# Patient Record
Sex: FEMALE | Employment: OTHER | URBAN - METROPOLITAN AREA
[De-identification: names, ages, dates, MRNs, and addresses within clinical notes are randomized per-mention and may not be internally consistent; named-entity substitution may affect disease eponyms.]

---

## 2017-05-15 ENCOUNTER — OFFICE VISIT (OUTPATIENT)
Dept: FAMILY MEDICINE | Facility: CLINIC | Age: 62
End: 2017-05-15

## 2017-05-15 VITALS
OXYGEN SATURATION: 97 % | SYSTOLIC BLOOD PRESSURE: 172 MMHG | WEIGHT: 137.6 LBS | TEMPERATURE: 97.6 F | HEART RATE: 59 BPM | HEIGHT: 59 IN | BODY MASS INDEX: 27.74 KG/M2 | DIASTOLIC BLOOD PRESSURE: 79 MMHG

## 2017-05-15 DIAGNOSIS — I10 BENIGN ESSENTIAL HYPERTENSION: Primary | ICD-10-CM

## 2017-05-15 DIAGNOSIS — J11.1 INFLUENZA-LIKE ILLNESS: ICD-10-CM

## 2017-05-15 DIAGNOSIS — R05.9 COUGH: ICD-10-CM

## 2017-05-15 RX ORDER — AMLODIPINE BESYLATE 5 MG/1
5 TABLET ORAL DAILY
Qty: 90 TABLET | Refills: 1 | Status: SHIPPED | OUTPATIENT
Start: 2017-05-15 | End: 2017-07-26

## 2017-05-15 NOTE — MR AVS SNAPSHOT
After Visit Summary   5/15/2017    Kitty Ibarra    MRN: [id number]           Patient Information     Date Of Birth          1955        Visit Information        Provider Department      5/15/2017 9:20 AM Fany Colindres MD [facility]        Today's Diagnoses     Benign essential hypertension    -  1    Cough        Influenza-like illness          Care Instructions    For blood pressure:  1. Take amlodipine 5 mg daily  2. Use blood pressure cuff at home, and write down the numbers you get    For illness:  1. Use 1 teaspoon of honey for cough 2-3 times per day  2. Drink plenty of fluids  3. Use tylenol as needed for pain    Follow up in 2 weeks for blood pressure recheck        Follow-ups after your visit        Who to contact     Please call your clinic at [phone] to:    Ask questions about your health    Make or cancel appointments    Discuss your medicines    Learn about your test results    Speak to your doctor   If you have compliments or concerns about an experience at your clinic, or if you wish to file a complaint, please contact [facility] Patient Relations at [phone] or email us at [email or web address]         Additional Information About Your Visit        MyChart Information     Rigelt is an electronic gateway that provides easy, online access to your medical records. With CoachUp, you can request a clinic appointment, read your test results, renew a prescription or communicate with your care team.     To sign up for Rigelt visit the website at [email or web address]   You will be asked to enter the access code listed below, as well as some personal information. Please follow the directions to create your username and password.     Your access code is: [email or web address]  Expires: 2017 10:36 AM     Your access code will  in 90 days. If you need help or a new code, please contact your [facility]  "Clinic or call [phone] for assistance.        Care EveryWhere ID     This is your Care EveryWhere ID. This could be used by other organizations to access your [facility] medical records  [id number]        Your Vitals Were     Pulse Temperature Height Pulse Oximetry BMI (Body Mass Index)       59 97.6  F (36.4  C) (Oral) 4' 10.5\" (148.6 cm) 97% 28.27 kg/m2        Blood Pressure from Last 3 Encounters:   05/15/17 172/79   09/26/16 152/65    Weight from Last 3 Encounters:   05/15/17 137 lb 9.6 oz (62.4 kg)   09/26/16 138 lb (62.6 kg)              We Performed the Following     XR CHEST 2 VW          Today's Medication Changes          These changes are accurate as of: 5/15/17 10:36 AM.  If you have any questions, ask your nurse or doctor.               Start taking these medicines.        Dose/Directions    order for DME   Used for:  Benign essential hypertension   Started by:  Fany Colindres MD        Blood pressure cuff. To be used daily   Quantity:  1 Units   Refills:  0            Where to get your medicines      These medications were sent to [facility] - [place], [place] - [address], [place] [address]     Phone:  [phone]     amLODIPine 5 MG tablet         Some of these will need a paper prescription and others can be bought over the counter.  Ask your nurse if you have questions.     Bring a paper prescription for each of these medications     order for DME                Primary Care Provider Office Phone # Fax #    Nas Snell [phone]       [address] [address]        Thank you!     Thank you for choosing [facility]  for your care. Our goal is always to provide you with excellent care. Hearing back from our patients is one way we can continue to improve our services. Please take a few minutes to complete the written survey that you may receive in the mail after your visit with us. " Thank you!             Your Updated Medication List - Protect others around you: Learn how to safely use, store and throw away your medicines at www.disposemymeds.org.          This list is accurate as of: 5/15/17 10:36 AM.  Always use your most recent med list.                   Brand Name Dispense Instructions for use    amLODIPine 5 MG tablet    NORVASC    90 tablet    Take 1 tablet (5 mg) by mouth daily       gabapentin 300 MG capsule    NEURONTIN    90 capsule    Take 1 capsule (300 mg) by mouth 3 times daily       LIPITOR 20 MG tablet   Generic drug:  atorvastatin     30 tablet    Take 1 tablet (20 mg) by mouth daily       order for DME     1 Units    Blood pressure cuff. To be used daily

## 2017-05-15 NOTE — PROGRESS NOTES
"       SUBJECTIVE       Kitty Ibarra is a 61 year old  female with a PMH significant for:     Patient Active Problem List   Diagnosis     Hepatitis C virus carrier state     Chronic low back pain     Hyperlipidemia LDL goal <100     Screening mammogram     She presents with medication refill and cough.    She is having fever, chills, body aches, cough, and just generally feels unwell.  The cough has been present for the last week.  It was initially productive of clear sputum, but now she has dark yellow sputum production.  She feels feverish and chilled for the last 2 days.  She has not taken her temperature at home. These symptoms has also been accompanied by back and body pains.  Eyes have also been red and watery.  She has also been congested. No nausea or vomiting.   has similar illness, but he got better and she has continued to be sick. Over the last week, patient reports that she feels her symptoms are worsening.  She did not get her flu shot this last year. She has been having some headaches, and she is feeling some blurred vision for the last week    She has not been taking her blood pressure medications for the last 4-5 months. She has been out of her prescription and would like a refill today.  She cannot remember the name of the medication she is supposed to be taking.  Her daughter works downstairs at [employer].  BP was controlled when she was taking the medications.  No chest pain or swelling her legs. Would like a blood pressure cuff at home.    PMH, Medications and Allergies were reviewed and updated as needed.    ROS as above        OBJECTIVE     Vitals:    05/15/17 0934 05/15/17 0944   BP: 179/79 172/79   Pulse: 59 59   Temp: 97.6  F (36.4  C)    TempSrc: Oral    SpO2: 97%    Weight: 137 lb 9.6 oz (62.4 kg)    Height: 4' 10.5\" (148.6 cm)      Body mass index is 28.27 kg/(m^2).    General: Alert, appropriate, and cooperative.  Appears stated age.  Appears somewhat ill.   HEENT:  Atraumatic. "  Pupils equal, round, and reactive bilaterally.  Extraocular movements intact. Right middle ear effusion.  Left TM normal.  Nasal turbinates red and boggy.  Mouth shows moist mucous membranes.    Neck:  Supple.  No adenopathy.  Thyroid palpation within normal limits.  CV:  Regular rhythm and rate.  No murmurs, rubs, or gallops.  Lungs:   Minimal crackles at the left base.  Otherwise, clear to auscultation.  Abd:  Soft, non-distended, non-tender.  Bowel sounds present.  No masses or organomegaly to palpation.  Ext:  Distal pulses 2+ x4.  No lower extremity edema.  Skin:  No obvious rashes, jaundice, or suspicious lesions.    CXR: Personally reviewed by myself and Dr. Ambrose.  No evidence of infiltrate. Will await formal read from radiology.    ASSESSMENT AND PLAN     (I10) Benign essential hypertension  (primary encounter diagnosis)  Comment: Patient has been out of her amlodipine for multiple months now.  BP elevated x2 today.  Would like a refill today.  No CP, SOB, LE edema.  Plan: Refilled amlodipine 5 mg daily.  Prescribed home BP cuff.  Follow up in 2 weeks for BP recheck.    (R69) Influenza-like illness  (R05) Cough  Comment: Patient with 1 week of cough, body aches, subjective fevers, chills.  O2 sat is normal on exam.  CXR negative for infiltrate.  Starting to feel a little bit better today.  Suspect viral illness, rather than PNA.  Plan: Supportive treatment.  Return in 1 week if symptoms not improved.      RTC in 2 weeks for follow up of BP or sooner if develops new or worsening symptoms.    Staffed with Dr. Ambrose.    Fany Colindres MD ([place]) PGY-2  [facility]  5/15/2017

## 2017-05-15 NOTE — PATIENT INSTRUCTIONS
For blood pressure:  1. Take amlodipine 5 mg daily  2. Use blood pressure cuff at home, and write down the numbers you get    For illness:  1. Use 1 teaspoon of honey for cough 2-3 times per day  2. Drink plenty of fluids  3. Use tylenol as needed for pain    Follow up in 2 weeks for blood pressure recheck

## 2017-05-15 NOTE — PROGRESS NOTES
Preceptor attestation:  Patient seen and discussed with the resident. Assessment and plan reviewed with resident and agreed upon.  Supervising physician: Isma Ambrose  [facility]

## 2017-07-26 ENCOUNTER — RECORDS - HEALTHEAST (OUTPATIENT)
Dept: ADMINISTRATIVE | Facility: OTHER | Age: 62
End: 2017-07-26

## 2017-07-26 ENCOUNTER — OFFICE VISIT (OUTPATIENT)
Dept: FAMILY MEDICINE | Facility: CLINIC | Age: 62
End: 2017-07-26

## 2017-07-26 VITALS
SYSTOLIC BLOOD PRESSURE: 166 MMHG | BODY MASS INDEX: 28.71 KG/M2 | WEIGHT: 136.8 LBS | TEMPERATURE: 97.7 F | DIASTOLIC BLOOD PRESSURE: 80 MMHG | HEART RATE: 55 BPM | HEIGHT: 58 IN

## 2017-07-26 DIAGNOSIS — I10 BENIGN ESSENTIAL HYPERTENSION: ICD-10-CM

## 2017-07-26 DIAGNOSIS — R22.1 NECK MASS: Primary | ICD-10-CM

## 2017-07-26 DIAGNOSIS — E78.5 HYPERLIPIDEMIA LDL GOAL <100: ICD-10-CM

## 2017-07-26 RX ORDER — AMLODIPINE BESYLATE 5 MG/1
5 TABLET ORAL DAILY
Qty: 90 TABLET | Refills: 3 | Status: SHIPPED | OUTPATIENT
Start: 2017-07-26 | End: 2018-08-08

## 2017-07-26 RX ORDER — ATORVASTATIN CALCIUM 20 MG/1
20 TABLET, FILM COATED ORAL DAILY
Qty: 90 TABLET | Refills: 3 | Status: SHIPPED | OUTPATIENT
Start: 2017-07-26 | End: 2018-08-08

## 2017-07-26 NOTE — PATIENT INSTRUCTIONS
Start taking amlodipine 5mg once a day.     Have ultrasound of neck done soon. Follow-up with me in 2-3 days after this is done.       [facility]  Radiology Department 1st floor  [address] [address]  [phone]    Appointment  Date:7/27/17  Time:7:45am arrival     Please contact the above clinic if you need to cancel or reschedule. Feel free to contact me with any questions. Thanks!    Cherie  Referral Coordinator  [phone]    Gave to patient and .

## 2017-07-26 NOTE — MR AVS SNAPSHOT
After Visit Summary   2017    Kitty Ibarra    MRN: [id number]           Patient Information     Date Of Birth          1955        Visit Information        Provider Department      2017 9:20 AM Nas Snell MD [facility]        Today's Diagnoses     Neck mass    -  1    Benign essential hypertension        Hyperlipidemia LDL goal <100          Care Instructions    Start taking amlodipine 5mg once a day.     Have ultrasound of neck done soon. Follow-up with me in 2-3 days after this is done.                 Follow-ups after your visit        Future tests that were ordered for you today     Open Future Orders        Priority Expected Expires Ordered    US Head Neck Soft Tissue Routine  2018            Who to contact     Please call your clinic at [phone] to:    Ask questions about your health    Make or cancel appointments    Discuss your medicines    Learn about your test results    Speak to your doctor   If you have compliments or concerns about an experience at your clinic, or if you wish to file a complaint, please contact [facility] Patient Relations at [phone] or email us at [email or web address]         Additional Information About Your Visit        MyChart Information     locr is an electronic gateway that provides easy, online access to your medical records. With locr, you can request a clinic appointment, read your test results, renew a prescription or communicate with your care team.     To sign up for [employer] visit the website at [email or web address]   You will be asked to enter the access code listed below, as well as some personal information. Please follow the directions to create your username and password.     Your access code is: [email or web address]  Expires: 2017 10:36 AM     Your access code will  in 90 days. If you need help or a new code, please contact your [facility]  "[facility] or call [phone] for assistance.        Care EveryWhere ID     This is your Care EveryWhere ID. This could be used by other organizations to access your [facility] medical records  [id number]        Your Vitals Were     Pulse Temperature Height BMI (Body Mass Index)          55 97.7  F (36.5  C) (Oral) 4' 10\" (147.3 cm) 28.59 kg/m2         Blood Pressure from Last 3 Encounters:   07/26/17 166/80   05/15/17 172/79   09/26/16 152/65    Weight from Last 3 Encounters:   07/26/17 136 lb 12.8 oz (62.1 kg)   05/15/17 137 lb 9.6 oz (62.4 kg)   09/26/16 138 lb (62.6 kg)                 Where to get your medicines      These medications were sent to [facility] - [place], [place] - [address], [place] [address]     Phone:  [phone]     amLODIPine 5 MG tablet    atorvastatin 20 MG tablet          Primary Care Provider Office Phone # Fax #    Nas Lashon Snell [phone]       [address] [address]        Equal Access to Services     MANOLO KENDRICK AH: Hadii kaylyn palmo Soavaali, waaxda luqadaha, qaybta kaalmada adeegyada, daria blackman. So [facility] [phone].    ATENCIÓN: Si habla español, tiene a adorno disposición servicios gratuitos de asistencia lingüística. Jana al [phone].    We comply with applicable federal civil rights laws and [place] laws. We do not discriminate on the basis of race, color, national origin, age, disability sex, sexual orientation or gender identity.            Thank you!     Thank you for choosing [facility]  for your care. Our goal is always to provide you with excellent care. Hearing back from our patients is one way we can continue to improve our services. Please take a few minutes to complete the written survey that you may receive in the mail after your visit with us. Thank you!             Your Updated Medication List - Protect others " around you: Learn how to safely use, store and throw away your medicines at www.disposemymeds.org.          This list is accurate as of: 7/26/17  9:55 AM.  Always use your most recent med list.                   Brand Name Dispense Instructions for use Diagnosis    amLODIPine 5 MG tablet    NORVASC    90 tablet    Take 1 tablet (5 mg) by mouth daily    Benign essential hypertension       atorvastatin 20 MG tablet    LIPITOR    90 tablet    Take 1 tablet (20 mg) by mouth daily    Hyperlipidemia LDL goal <100       gabapentin 300 MG capsule    NEURONTIN    90 capsule    Take 1 capsule (300 mg) by mouth 3 times daily    Chronic low back pain, unspecified back pain laterality, with sciatica presence unspecified       order for DME     1 Units    Blood pressure cuff. To be used daily    Benign essential hypertension

## 2017-07-26 NOTE — PROGRESS NOTES
"       SUBJECTIVE       Kitty Ibarra is a 62 year old  female with a PMH significant for:     Patient Active Problem List   Diagnosis     Hepatitis C virus carrier state (H)     Chronic low back pain     Hyperlipidemia LDL goal <100     Screening mammogram     Benign essential hypertension     Patient presents with:  Neck Pain: check lumps on right neck for 3 days     reporting 3 days of neck symptoms on the right side, has tried massaging this area. There is no pain. No unexpected weight loss. No fevers or night sweats. No reported recent illnesses including respiratory illnesses. No bruising of the area. Denies any trauma to the area recently. No difficulty with breathing. Denies any trauma to the area. It is painful tho if she presses firmly and tries to move it around.     PMH, Medications and Allergies were reviewed and updated as needed.    ROS: As above per HPI        OBJECTIVE     Vitals:    07/26/17 0934   BP: 171/76   BP Location: Right arm   Patient Position: Sitting   Cuff Size: Adult Large   Pulse: 52   Temp: 97.7  F (36.5  C)   TempSrc: Oral   Weight: 136 lb 12.8 oz (62.1 kg)   Height: 4' 10\" (147.3 cm)     Body mass index is 28.59 kg/(m^2).    GEN: NAD, AAox3, appears stated age  CV: RRR no m/r/g, s1 and s2 noted  HEENT: EOMI, head normocephalic, sclera anicteric, trachea midline  PULM: clear bilaterally without wheezes/rhonchi/rales, non-labored  ABD: soft, non-tender, + BS in all quadrants  Neck: 3x4cm mass in the right neck in the area of lower anterior cervical chain nodes. No erythema or bruising of the area. Thyroid appears normal.   NEURO: GCS 15  GAIT: normal  PSYCH: euthymic, linear thoughts, no delusions/hallucinations, comprehensible    LABS/IMAGING/EKG  No results found for this or any previous visit (from the past 24 hour(s)).    ASSESSMENT AND PLAN   Neck mass of R side  We'll do ultrasound. This is larger than expected for a reactive lymphadenitis. Does not appear to be cellulitis. No trauma " to suggest hematoma but quality may suggest so. If this rapidly grows, will need to go to ED for possible evacuation/drainage as this would suggest hematoma.     Benign essential hypertension  5mg of amlodipine refilled. Patient encouraged to take daily, she will do this as I educated on her risk of heart and stroke disease       RTC in 2 days for follow up of ultrasound results or sooner if develops new or worsening symptoms.      Nas Snell MD PGY3  [facility] Medicine    Discussed with Dr. Grant Dalal who agrees with the above assessment and plan.

## 2017-07-26 NOTE — PROGRESS NOTES
Preceptor attestation:  Patient seen and discussed with the resident. Assessment and plan reviewed with resident and agreed upon.  Supervising physician: Grant Dalal  [facility]

## 2017-07-27 ENCOUNTER — HOSPITAL ENCOUNTER (OUTPATIENT)
Dept: ULTRASOUND IMAGING | Facility: CLINIC | Age: 62
Discharge: HOME | End: 2017-07-27
Attending: FAMILY MEDICINE

## 2017-07-27 DIAGNOSIS — R22.1 NECK MASS: ICD-10-CM

## 2017-07-28 ENCOUNTER — TRANSFERRED RECORDS (OUTPATIENT)
Dept: HEALTH INFORMATION MANAGEMENT | Facility: CLINIC | Age: 62
End: 2017-07-28

## 2017-07-31 ENCOUNTER — OFFICE VISIT (OUTPATIENT)
Dept: FAMILY MEDICINE | Facility: CLINIC | Age: 62
End: 2017-07-31

## 2017-07-31 VITALS
HEART RATE: 65 BPM | WEIGHT: 137.4 LBS | BODY MASS INDEX: 27.7 KG/M2 | DIASTOLIC BLOOD PRESSURE: 81 MMHG | HEIGHT: 59 IN | SYSTOLIC BLOOD PRESSURE: 151 MMHG

## 2017-07-31 DIAGNOSIS — Z00.00 ROUTINE GENERAL MEDICAL EXAMINATION AT A HEALTH CARE FACILITY: ICD-10-CM

## 2017-07-31 DIAGNOSIS — E04.2 NONTOXIC MULTINODULAR GOITER: Primary | ICD-10-CM

## 2017-07-31 DIAGNOSIS — H11.002 PTERYGIUM EYE, LEFT: ICD-10-CM

## 2017-07-31 LAB
CHOLEST SERPL-MCNC: 200.2 MG/DL (ref 0–200)
CHOLEST/HDLC SERPL: 3.9 {RATIO} (ref 0–5)
HDLC SERPL-MCNC: 51.9 MG/DL
LDLC SERPL CALC-MCNC: 130 MG/DL (ref 0–129)
TRIGL SERPL-MCNC: 90.7 MG/DL (ref 0–150)
TSH SERPL DL<=0.05 MIU/L-ACNC: 0.32 UIU/ML (ref 0.3–5)
VLDL CHOLESTEROL: 18.1 MG/DL (ref 7–32)

## 2017-07-31 NOTE — PROGRESS NOTES
"       SUBJECTIVE       Kitty Ibarra is a 62 year old  female with a PMH significant for:     Patient Active Problem List   Diagnosis     Hepatitis C virus carrier state (H)     Chronic low back pain     Hyperlipidemia LDL goal <100     Screening mammogram     Benign essential hypertension     Multinodular goiter ;  TI-RADS 2 classification     Pterygium eye, left     Patient presents with:  Results: u/s results     Endorses slight pain at the mass. But not getting larger.   No significant changes in weight over the last year. Energy level has been about the same. No chills/sweats that are persistent. Denies rapid heart racing.     Hepatitis C treatment  In past - was treated 3 months by [facility] for this condition.     Colonoscopy she is unaware having done in the last few years.     Reports doing mammogram 3 years ago, elects not to have repeat screening at this time    Daughter says records are at either [facility] ([facility] [address] [facility]) or at [facility]    PMH, Medications and Allergies were reviewed and updated as needed.    ROS: As above per HPI        OBJECTIVE     Vitals:    07/31/17 0850 07/31/17 0853   BP: 152/83 151/81   Pulse: 66 65   Weight: 137 lb 6.4 oz (62.3 kg)    Height: 4' 10.5\" (148.6 cm)      Body mass index is 28.23 kg/(m^2).    GEN: NAD, AAox3, appears stated age  CV: cap refill < 2 seconds  HEENT: EOMI, head normocephalic, sclera anicteric, trachea midline, eye web/growth on left medial side of left eye  PULM: clear bilaterally without wheezes/rhonchi/rales, non-labored  ABD: soft, non-tender, + BS in all quadrants  NEURO: GCS 15  GAIT: normal  PSYCH: euthymic, linear thoughts, no delusions/hallucinations, comprehensible  NECK: firm mass of neck unchanged from previous exam    LABS/IMAGING/EKG    U/S NECK FINDINGS:     Global enlarged thyroid with diffuse heterogeneity and multinodular goiter (though heterogeneity limits focal nodule evaluation). Coarse " calcifications are present in the thyroid.     RIGHT thyroid lobe measures 8.5 x 3.3 x 4.2 cm. Heterogeneous echotexture  Nodule: More focal mid to lower right thyroid nodule measures 3 x 1.8 x 2.5 cm.   Composition: Mixed cystic and solid 1   Echogenicity: Hyperechoic or isoechoic 1   Shape: Wider-than-tall 0   Margin: Ill-defined 0   Echogenic Foci: None, or large comet-tail artifacts 0   Point Total: 1-2 points. TI-RADS 2. Not suspicious. No FNA.      LEFT thyroid lobe measures 6.9 x 4.1 x 3.7 cm. Diffusely heterogeneous echotexture with difficult evaluation for focal nodule     Isthmus measures 2.7 cm.     No cervical lymphadenopathy.     IMPRESSION:   CONCLUSION:  1.  Global enlarged thyroid with diffuse heterogeneity and multinodular goiter (though heterogeneity limits focal nodule evaluation). More focal right thyroid nodule demonstrates TI-RADS 2 classification.  2.  Cannot accurately assess for tracheal mass effect from enlarged thyroid on this exam. Correlate with clinical symptoms.    ASSESSMENT AND PLAN     Multinodular goiter ;  TI-RADS 2 classification  We'll do some screening tests. No indication for follow-up ultrasound or FNA. Heat packs and ibuprofen/tylenol for neck discomfort  - Thyroid P[place] ([facility])  - Thyroid Peroxidase Anaid ([facility])    Routine general medical examination at a health care facility  Patient agreed to Colonoscopy. We'll screen Lipids. Hepatitis C worked up in recent history as she is a carrier and was treated by GI at [facility], release of records obtained  - mammogram (patient declines)   - Colonoscopy  - Lipid Panel ([facility]) - Results < 1 hr    Pterygium eye, left  Lubricating eye drops as needed. Not severe to point it's obstructing vision. This is an irritating condition, we can try referral to ophthalmology if this progresses but otherwise it has been bearable for her at the current state.     RTC in 2-4 weeks for follow up of labs and for physical exam or sooner if  develops new or worsening symptoms.    Future visit:   Follow-up labs drawn today, discuss pap smear, follow-up colon cancer screening, SELENA/PHQ-9 screening.     Nas Snell MD PGY3  [facility]    Discussed with Dr. Isma Ambrose who agrees with the above assessment and plan.

## 2017-07-31 NOTE — PATIENT INSTRUCTIONS
Please schedule a 40 minute appointment for a full physical exam. We can talk about your lab work at that time.     Colonoscopy to be scheduled with .     Neck pain use heat pack and ibuprofen/tylenol as needed    Continue to use lubricating eye drops for your left eye condition    [facility]  Colonoscopy  [phone]  Referral has been faxed they will contact pt to schedule  Dorie Black 10:40 AM 8/2/2017

## 2017-07-31 NOTE — MR AVS SNAPSHOT
After Visit Summary   7/31/2017    Kitty Ibarra    MRN: [id number]           Patient Information     Date Of Birth          1955        Visit Information        Provider Department      7/31/2017 9:00 AM Nas Snell MD [facility]        Today's Diagnoses     Multinodular goiter ;  TI-RADS 2 classification    -  1    Routine general medical examination at a health care facility        Pterygium eye, left          Care Instructions    Please schedule a 40 minute appointment for a full physical exam. We can talk about your lab work at that time.     Colonoscopy to be scheduled with .     Neck pain use heat pack and ibuprofen/tylenol as needed    Continue to use lubricating eye drops for your left eye condition              Follow-ups after your visit        Additional Services     GASTROENTEROLOGY ADULT REF PROCEDURE ONLY       Last Lab Result: No results found for: CR  Body mass index is 28.23 kg/(m^2).     Needed:  Yes  Language:  Hmong    Patient will be contacted to schedule procedure.     Please be aware that coverage of these services is subject to the terms and limitations of your health insurance plan.  Call member services at your health plan with any benefit or coverage questions.  Any procedures must be performed at a [facility] facility OR coordinated by your clinic's referral office.    Please bring the following with you to your appointment:    (1) Any X-Rays, CTs or MRIs which have been performed.  Contact the facility where they were done to arrange for  prior to your scheduled appointment.    (2) List of current medications   (3) This referral request   (4) Any documents/labs given to you for this referral                  Who to contact     Please call your clinic at [phone] to:    Ask questions about your health    Make or cancel appointments    Discuss your medicines    Learn about your test results    Speak to your doctor   If you have  "compliments or concerns about an experience at your clinic, or if you wish to file a complaint, please contact [facility] Patient Relations at [phone] or email us at [email or web address]         Additional Information About Your Visit        Habbitshart Information     Immusoft is an electronic gateway that provides easy, online access to your medical records. With Immusoft, you can request a clinic appointment, read your test results, renew a prescription or communicate with your care team.     To sign up for Immusoft visit the website at [email or web address]   You will be asked to enter the access code listed below, as well as some personal information. Please follow the directions to create your username and password.     Your access code is: [email or web address]  Expires: 2017 10:36 AM     Your access code will  in 90 days. If you need help or a new code, please contact your [facility] or call [phone] for assistance.        Care EveryWhere ID     This is your Care EveryWhere ID. This could be used by other organizations to access your [facility] medical records  [id number]        Your Vitals Were     Pulse Height BMI (Body Mass Index)             65 4' 10.5\" (148.6 cm) 28.23 kg/m2          Blood Pressure from Last 3 Encounters:   17 151/81   17 166/80   05/15/17 172/79    Weight from Last 3 Encounters:   17 137 lb 6.4 oz (62.3 kg)   17 136 lb 12.8 oz (62.1 kg)   05/15/17 137 lb 9.6 oz (62.4 kg)              We Performed the Following     GASTROENTEROLOGY ADULT REF PROCEDURE ONLY     Lipid Panel ([facility]) - Results < 1 hr     Thyroid [place] ([facility])     Thyroid Peroxidase [place] ([facility])          Today's Medication Changes          These changes are accurate as of: 17  9:25 AM.  If you have any questions, ask your nurse or doctor.               Start taking these medicines.        Dose/Directions    " glycerin-hypromellose- 0.2-0.2-1 % Soln ophthalmic solution   Commonly known as:  ARTIFICIAL TEARS   Used for:  Pterygium eye, left   Started by:  Nas Snell MD        Dose:  1 drop   Place 1 drop Into the left eye 3 times daily as needed for dry eyes   Quantity:  1 Bottle   Refills:  11         Stop taking these medicines if you haven't already. Please contact your care team if you have questions.     gabapentin 300 MG capsule   Commonly known as:  NEURONTIN   Stopped by:  Nas Snell MD                Where to get your medicines      These medications were sent to [facility] - [place], [place] - [address], [place] [address]     Phone:  [phone]     glycerin-hypromellose- 0.2-0.2-1 % Soln ophthalmic solution                Primary Care Provider Office Phone # Fax #    Nas Snell [phone]       [address] [address]        Equal Access to Services     KENNETH KENDRICK AH: Hadii kaylyn ku hadasho Soomaali, waaxda luqadaha, qaybta kaalmada adeegyada, waxay idiin hayelli malik . So [facility] [phone].    ATENCIÓN: Si habla español, tiene a adorno disposición servicios gratuitos de asistencia lingüística. [facility] [phone].    We comply with applicable federal civil rights laws and [place] laws. We do not discriminate on the basis of race, color, national origin, age, disability sex, sexual orientation or gender identity.            Thank you!     Thank you for choosing [facility]  for your care. Our goal is always to provide you with excellent care. Hearing back from our patients is one way we can continue to improve our services. Please take a few minutes to complete the written survey that you may receive in the mail after your visit with us. Thank you!             Your Updated Medication List - Protect others around you: Learn how to safely use, store and throw  away your medicines at www.disposemymeds.org.          This list is accurate as of: 7/31/17  9:25 AM.  Always use your most recent med list.                   Brand Name Dispense Instructions for use Diagnosis    amLODIPine 5 MG tablet    NORVASC    90 tablet    Take 1 tablet (5 mg) by mouth daily    Benign essential hypertension       atorvastatin 20 MG tablet    LIPITOR    90 tablet    Take 1 tablet (20 mg) by mouth daily    Hyperlipidemia LDL goal <100       glycerin-hypromellose- 0.2-0.2-1 % Soln ophthalmic solution    ARTIFICIAL TEARS    1 Bottle    Place 1 drop Into the left eye 3 times daily as needed for dry eyes    Pterygium eye, left       order for DME     1 Units    Blood pressure cuff. To be used daily    Benign essential hypertension

## 2017-08-01 LAB — THYR PEROXIDASE ABY: 14 IU/ML (ref 0–5.6)

## 2017-08-08 NOTE — PROGRESS NOTES
Preceptor attestation:  Patient seen and discussed with the resident. Assessment and plan reviewed with resident and agreed upon.  Supervising physician: Isma Ambrose  [facility]

## 2018-01-07 ENCOUNTER — HEALTH MAINTENANCE LETTER (OUTPATIENT)
Age: 63
End: 2018-01-07

## 2018-08-08 DIAGNOSIS — E78.5 HYPERLIPIDEMIA LDL GOAL <100: ICD-10-CM

## 2018-08-08 DIAGNOSIS — I10 BENIGN ESSENTIAL HYPERTENSION: ICD-10-CM

## 2018-08-10 RX ORDER — ATORVASTATIN CALCIUM 20 MG/1
20 TABLET, FILM COATED ORAL DAILY
Qty: 90 TABLET | Refills: 3 | Status: SHIPPED | OUTPATIENT
Start: 2018-08-10 | End: 2019-12-30

## 2018-08-10 RX ORDER — AMLODIPINE BESYLATE 5 MG/1
5 TABLET ORAL DAILY
Qty: 90 TABLET | Refills: 3 | Status: SHIPPED | OUTPATIENT
Start: 2018-08-10 | End: 2019-12-30

## 2018-08-14 NOTE — TELEPHONE ENCOUNTER
Spoke to pt's daughter in law and she states that pt is out of town right now. She will help schedule a physical once pt is back in town. Kayli Black MA

## 2019-12-30 ENCOUNTER — OFFICE VISIT (OUTPATIENT)
Dept: FAMILY MEDICINE | Facility: CLINIC | Age: 64
End: 2019-12-30
Payer: COMMERCIAL

## 2019-12-30 VITALS
SYSTOLIC BLOOD PRESSURE: 128 MMHG | OXYGEN SATURATION: 97 % | RESPIRATION RATE: 24 BRPM | WEIGHT: 145.2 LBS | HEART RATE: 62 BPM | BODY MASS INDEX: 29.83 KG/M2 | DIASTOLIC BLOOD PRESSURE: 77 MMHG | TEMPERATURE: 97.7 F

## 2019-12-30 DIAGNOSIS — E04.9 GOITER: Primary | ICD-10-CM

## 2019-12-30 RX ORDER — AMLODIPINE BESYLATE 5 MG/1
5 TABLET ORAL DAILY
Qty: 90 TABLET | Refills: 3 | Status: SHIPPED | OUTPATIENT
Start: 2019-12-30 | End: 2021-03-04

## 2019-12-30 RX ORDER — ATORVASTATIN CALCIUM 20 MG/1
20 TABLET, FILM COATED ORAL DAILY
Qty: 90 TABLET | Refills: 3 | Status: SHIPPED | OUTPATIENT
Start: 2019-12-30 | End: 2021-05-24

## 2019-12-30 NOTE — PROGRESS NOTES
SUBJECTIVE       Kitty Ibarra is a 64 year old  female with a PMH significant for:     Patient Active Problem List   Diagnosis     Hepatitis C virus carrier state (H)     Chronic low back pain     Hyperlipidemia LDL goal <100     Screening mammogram     Benign essential hypertension     Multinodular goiter ;  TI-RADS 2 classification     Pterygium eye, left     She presents with ED follow up.  She was seen for a UTI that is improved.  However, they saw an abnormality on CXR.  There was marked tracheal deviation that was consistent with compression from a mass or substernal goiter.  She reports a long standing goiter that has gradually enlarged over the past few years.  She denies any pain or shortness of breath from this.  No stridor, no dysphagia, no odynophagia.  No fevers, chills, or weight loss.    PMH, Medications and Allergies were reviewed and updated as needed.        REVIEW OF SYSTEMS     INTEGUMENTARY/SKIN: NEGATIVE for worrisome rashes, moles or lesions  EYES: NEGATIVE for vision changes or irritation  NEURO: NEGATIVE for weakness, dizziness or paresthesias        OBJECTIVE     Vitals:    12/30/19 0856   BP: 128/77   Pulse: 62   Resp: 24   Temp: 97.7  F (36.5  C)   TempSrc: Oral   SpO2: 97%   Weight: 65.9 kg (145 lb 3.2 oz)     Body mass index is 29.83 kg/m .    Constitutional: Awake, alert, cooperative, no apparent distress, and appears stated age.  Eyes: Lids and lashes normal, pupils equal, round and reactive to light, extra ocular muscles intact, sclera clear, conjunctiva normal.  ENT: Normocephalic, without obvious abnormality, atramatic, sinuses nontender on palpation, external ears without lesions, oral pharynx with moist mucus membranes, tonsils without erythema or exudates, gums normal and good dentition.  Neck: massive goiter predominantly on the midline and on the right side of the neck.  Hematologic / Lymphatic: No cervical lymphadenopathy and no supraclavicular lymphadenopathy.  Lungs: No  increased work of breathing, good air exchange, clear to auscultation bilaterally, no crackles or wheezing.  Cardiovascular: Regular rate and rhythm, normal S1 and S2, no S3 or S4, and no murmur noted.  Abdomen: No scars, normal bowel sounds, soft, non-distended, non-tender, no masses palpated, no hepatosplenomegally.  Neuropsychiatric: Normal affect, mood, orientation, memory and insight.  Skin: No rashes, erythema, pallor, petechia or purpura.    No results found for this or any previous visit (from the past 24 hour(s)).        ASSESSMENT AND PLAN     1. Goiter  Will check the scans  - CT SOFT TISSUE NECK W/O CONTRAST; Future  - CT CHEST W/O & W CONTRAST; Future        RTC in 1 week from the CT for follow up of the goiter or sooner if develops new or worsening symptoms.    Claudy Soriano MD

## 2019-12-30 NOTE — PATIENT INSTRUCTIONS
[facility]  Phone: [phone]  Fax: [phone]    [facility]  [address], [address]  [place]  [address]    Appointment:  Monday January 13, 2020  Arrival Time:  7:00 am    Nothing to eat or drink 3 hours prior.     Please bring a copy of your insurance card and photo ID    If you cannot make this appointment, please call [phone] to reschedule    Referral and demographics faxed to [phone]

## 2020-01-13 ENCOUNTER — TRANSFERRED RECORDS (OUTPATIENT)
Dept: HEALTH INFORMATION MANAGEMENT | Facility: CLINIC | Age: 65
End: 2020-01-13

## 2020-01-13 DIAGNOSIS — E04.9 GOITER: ICD-10-CM

## 2020-01-21 ENCOUNTER — TELEPHONE (OUTPATIENT)
Dept: FAMILY MEDICINE | Facility: CLINIC | Age: 65
End: 2020-01-21

## 2020-01-21 DIAGNOSIS — E04.2 NONTOXIC MULTINODULAR GOITER: Primary | ICD-10-CM

## 2020-01-21 NOTE — TELEPHONE ENCOUNTER
Patients daughter Jenn called in regards to letter they received from Dr. Soriano. Letter states because on lab results they should see an endocrinologist. Jenn is wondering if Dr. Soriano put in a referral or if she is supposed to find a provider on her own.     I let Jenn know that I would send Dr. Soriano a message and ask if he can submit a referral.     Viv Rutherford

## 2020-01-27 NOTE — TELEPHONE ENCOUNTER
01/27/20   Called patient contact May to ask if they were willing to travel to the [facility] for the endocrine appt. As [facility] does not have an MD right now and the PA's can only see certain diagnosis.     May said she will have Jenn call me back to discuss.       Viv Rutherford

## 2020-01-28 ENCOUNTER — TELEPHONE (OUTPATIENT)
Dept: FAMILY MEDICINE | Facility: CLINIC | Age: 65
End: 2020-01-28

## 2020-01-28 NOTE — TELEPHONE ENCOUNTER
Jenn called back wondering when they could get her mother in law in which the [facility] cant get the patient in for about 2 months. The patient is leaving on a trip from February 6, 2020 to May 2020. They said they will schedule when she gets back.  Mary Silverio, PHOEBE

## 2020-01-29 ENCOUNTER — TRANSCRIBE ORDERS (OUTPATIENT)
Dept: OTHER | Age: 65
End: 2020-01-29

## 2020-01-29 DIAGNOSIS — E04.2 MULTINODULAR GOITER: Primary | ICD-10-CM

## 2020-01-29 NOTE — TELEPHONE ENCOUNTER
Endocrine Clinic  [facility]  Phone:[phone]  Fax: [phone]    Demographics, referral, office note, imaging and med list faxed to [phone], they will contact patient to schedule. I will noted that patient to going to traveling from February 6th - May 2020.    Viv Rutherford

## 2020-01-31 ENCOUNTER — TELEPHONE (OUTPATIENT)
Dept: ENDOCRINOLOGY | Facility: CLINIC | Age: 65
End: 2020-01-31

## 2020-01-31 NOTE — TELEPHONE ENCOUNTER
To schedulers: Please schedule  for lst available endocrine within 28 days.  Get images pushed to PACS prior to the appt, including [facility] thyroid US 7/27/2017 and [facility] radiology 1/13/2020 CT neck and chest .  Preferred provider Darya Kenny Trost Lynn A Burmeister, MD  Endocrine triage

## 2020-02-07 ENCOUNTER — TELEPHONE (OUTPATIENT)
Dept: ENDOCRINOLOGY | Facility: CLINIC | Age: 65
End: 2020-02-07

## 2020-02-07 NOTE — TELEPHONE ENCOUNTER
To schedulers: Please see me 1/31/2020 triage on this same patient.  Why was this sent again and not handled yet?     Evita Kenny MD  Endocrine triage       Health Call Center    Phone Message    May a detailed message be left on voicemail: no     Reason for Call: Other: NP referred to Endocrinology for Multinodular goiter [E04.2] Ref Claudy Soriano from [place]     Action Taken: Other: Endocrinology    Travel Screening: Not Applicable

## 2020-02-17 NOTE — TELEPHONE ENCOUNTER
Previous encounter triage states:      Encounter was accidentally closed, causing it to disappear from our inbasket. Will call patient to schedule right away.

## 2020-02-19 NOTE — TELEPHONE ENCOUNTER
"To schedulers: Please add \"Get images pushed to PACS prior to the appt, including [facility] thyroid US 7/27/2017 and [facility] radiology 1/13/2020 CT neck and chest\" to notes for records retrieval staff to see.  "

## 2020-02-19 NOTE — TELEPHONE ENCOUNTER
Called and left message with  for pt to c/b to schedule 1st available NEN within 28 days. Ok to use 60 min SANTOS slot. Call/Michellec Frida or Karine if you need help.

## 2020-03-05 NOTE — TELEPHONE ENCOUNTER
LVMx2 for pt to schedule 1st available NEN within 28 days. OK to use 60 min SANTOS slot. Call/charlesc Frida or Karine for scheduling questions if any.

## 2020-04-17 ENCOUNTER — TELEPHONE (OUTPATIENT)
Dept: FAMILY MEDICINE | Facility: CLINIC | Age: 65
End: 2020-04-17

## 2020-04-17 NOTE — LETTER
April 17, 2020      Kitty Ibarra  [facility] [address]        Dear Kitty,      We tried reaching you by phone but were unable to connect with you. We are reaching out to see how you are doing. This is a very stressful time in the world, which can cause an increase in personal stress and anxiety.     Our clinic is open.  We are here for you and are ready to meet all of your healthcare needs.  We have delayed preventive care until July.  We want everyone who can to stay home during this time for their health and the health of all.  We are now having most visits over the phone, but will see people in person if your doctor agrees that it is necessary.  We also will have video visits starting on April 6, 2020.      Call us with any questions or concerns you may have, and know that we are all in this together.       Sincerely,     Your team at [facility]  [phone]

## 2021-03-01 ENCOUNTER — OFFICE VISIT (OUTPATIENT)
Dept: FAMILY MEDICINE | Facility: CLINIC | Age: 66
End: 2021-03-01
Payer: COMMERCIAL

## 2021-03-01 ENCOUNTER — RECORDS - HEALTHEAST (OUTPATIENT)
Dept: ADMINISTRATIVE | Facility: OTHER | Age: 66
End: 2021-03-01

## 2021-03-01 VITALS
WEIGHT: 149.2 LBS | DIASTOLIC BLOOD PRESSURE: 71 MMHG | SYSTOLIC BLOOD PRESSURE: 143 MMHG | TEMPERATURE: 98.3 F | BODY MASS INDEX: 30.65 KG/M2 | HEART RATE: 68 BPM | OXYGEN SATURATION: 97 % | RESPIRATION RATE: 18 BRPM

## 2021-03-01 DIAGNOSIS — E04.2 NONTOXIC MULTINODULAR GOITER: Primary | ICD-10-CM

## 2021-03-01 DIAGNOSIS — R10.84 ABDOMINAL PAIN, GENERALIZED: ICD-10-CM

## 2021-03-01 LAB
% IMMATURE GRANULOCYTES: 0 %
ABS IMMATURE GRANULOCYTES: 0 THOU/UL
ALBUMIN SERPL BCP-MCNC: 3.8 G/DL (ref 3.5–5)
ALP SERPL-CCNC: 104 U/L (ref 45–120)
ALT SERPL W/O P-5'-P-CCNC: 13 U/L (ref 0–45)
ANION GAP SERPL CALCULATED.3IONS-SCNC: 10 MMOL/L (ref 5–18)
AST SERPL-CCNC: 15 U/L (ref 0–40)
BASOPHILS # BLD AUTO: 0 THOU/UL (ref 0–0.2)
BASOPHILS NFR BLD AUTO: 1 % (ref 0–2)
BILIRUB SERPL-MCNC: 0.7 MG/DL (ref 0–1)
BILIRUBIN UR: NEGATIVE MG/DL
BLOOD UR: ABNORMAL MG/DL
BUN SERPL-MCNC: 16 MG/DL (ref 8–22)
CALCIUM SERPL-MCNC: 8.9 MG/DL (ref 8.5–10.5)
CHLORIDE SERPL-SCNC: 105 MMOL/L (ref 98–107)
CO2 SERPL-SCNC: 26 MMOL/L (ref 22–31)
CREAT SERPL-MCNC: 0.68 MG/DL (ref 0.6–1.1)
EOSINOPHIL # BLD AUTO: 0.3 THOU/UL (ref 0–0.4)
EOSINOPHIL NFR BLD AUTO: 4 % (ref 0–6)
ERYTHROCYTE [DISTWIDTH] IN BLOOD BY AUTOMATED COUNT: 12.7 % (ref 11–14.5)
GLUCOSE SERPL-MCNC: 85 MG/DL (ref 70–125)
GLUCOSE URINE: NEGATIVE
HCT VFR BLD AUTO: 42.1 % (ref 35–47)
HGB BLD-MCNC: 13.8 G/DL (ref 12–16)
KETONES UR QL: NEGATIVE MG/DL
LEUKOCYTE ESTERASE UR: ABNORMAL
LIPASE SERPL-CCNC: 39 U/L (ref 0–52)
LYMPHOCYTES # BLD AUTO: 2.5 THOU/UL (ref 0.8–4.4)
LYMPHOCYTES NFR BLD AUTO: 38 % (ref 20–40)
MCH RBC QN AUTO: 29.5 PG (ref 27–34)
MCHC RBC AUTO-ENTMCNC: 32.8 G/DL (ref 32–36)
MCV RBC AUTO: 90 FL (ref 80–100)
MONOCYTES # BLD AUTO: 0.5 THOU/UL (ref 0–0.9)
MONOCYTES NFR BLD AUTO: 8 % (ref 2–10)
NEUTROPHILS # BLD AUTO: 3.3 THOU/UL (ref 2–7.7)
NEUTROPHILS NFR BLD AUTO: 50 % (ref 50–70)
NITRITE UR QL STRIP: NEGATIVE MG/DL
PH UR STRIP: 6 [PH] (ref 4.5–8)
PLATELET # BLD AUTO: 227 THOU/UL (ref 140–440)
PMV BLD AUTO: 10.7 FL (ref 8.5–12.5)
POTASSIUM SERPL-SCNC: 4.1 MMOL/L (ref 3.5–5)
PROT SERPL-MCNC: 6.8 G/DL (ref 6–8)
PROTEIN UR: NEGATIVE MG/DL
RBC # BLD AUTO: 4.68 MILL/UL (ref 3.8–5.4)
SODIUM SERPL-SCNC: 141 MMOL/L (ref 136–145)
SP GR UR STRIP: 1.02 (ref 1–1.03)
TSH SERPL DL<=0.05 MIU/L-ACNC: 0.52 UIU/ML (ref 0.3–5)
UROBILINOGEN UR STRIP-ACNC: ABNORMAL E.U./DL
WBC # BLD AUTO: 6.6 THOU/UL (ref 4–11)

## 2021-03-01 PROCEDURE — 99214 OFFICE O/P EST MOD 30 MIN: CPT | Mod: GC | Performed by: STUDENT IN AN ORGANIZED HEALTH CARE EDUCATION/TRAINING PROGRAM

## 2021-03-01 PROCEDURE — 36415 COLL VENOUS BLD VENIPUNCTURE: CPT | Performed by: STUDENT IN AN ORGANIZED HEALTH CARE EDUCATION/TRAINING PROGRAM

## 2021-03-01 PROCEDURE — 81003 URINALYSIS AUTO W/O SCOPE: CPT | Performed by: STUDENT IN AN ORGANIZED HEALTH CARE EDUCATION/TRAINING PROGRAM

## 2021-03-01 RX ORDER — FAMOTIDINE 20 MG/1
20 TABLET, FILM COATED ORAL 2 TIMES DAILY
Qty: 60 TABLET | Refills: 0 | Status: SHIPPED | OUTPATIENT
Start: 2021-03-01

## 2021-03-01 NOTE — PATIENT INSTRUCTIONS
21   [facility]   [phone]  Fax Orders to [phone]     Order faxed to [phone], they will contact patient to schedule.     Viv Rutherford    21   ENDOCRINE REFERRAL    [facility]  Phone: [phone]  Fax: [phone]    Referral, demographics, office notes, labs and medication list faxed to [facility] at [phone] who will contact patient to schedule.     Viv Rutherford

## 2021-03-01 NOTE — PROGRESS NOTES
"    Assessment & Plan     Multinodular goiter ;  TI-RADS 2 classification  Patient with significantly enlarged thyroid appreciated on exam.  Is causing her some discomfort and even possibly making it hard to breathe at times.  Previously on imaging was found to have multinodular goiter, also had elevated thyroid peroxidase.  Patient should be seen by endocrinology and was referred 1 year ago but was not able to make it due to Covid pandemic.  In the meantime we will check thyroid cascade and start levothyroxine should TSH be low.  Will also obtain follow-up ultrasound.  - Thyroid [place] ([facility])  - US Head Neck Soft Tissue; Future  - ENDOCRINOLOGY ADULT REFERRAL; Future    Abdominal pain, generalized  Patient with minor generalized abdominal pain and symptoms consistent with reflux.  Will rule out liver gallbladder, urinary tract with labs, and trial famotidine with follow-up in 1 month.  - Lipase ([facility])  - Urinalysis ([facility] )  - famotidine (PEPCID) 20 MG tablet; Take 1 tablet (20 mg) by mouth 2 times daily  - CBC w/ Diff and Plt ([facility])  - Comprehensive Metabolic ([facility]) - Results > 1 hr      Return in about 4 weeks (around 3/29/2021) for with me, Follow up.    Andrez Fish MD  [facility]    Ruthy Tang is a 65 year old who presents for the following health issues     HPI   She is here today due to neck discomfort and some SOB, as well as abdominal pain.     Her neck discomfort is described as a pressure and at times it feels difficult to breathe. She also describes a stiffness sensation to her neck. She was referred to a specialist in the past but was unable to follow up due to covid. Her neck has grown bigger since the past. There is no pain.  Imaging in the past is shown multinodular goiter.  She is also had elevated thyroid peroxidase.    Her abdominal pain is a R flank pain stomach upset, \"hanging sensation\", burping with bitter mucous.      Patient " reports she is always cold, dry skin, some hair loss, chronically fatigued, difficulty sleeping.      Review of Systems   Constitutional, HEENT, cardiovascular, pulmonary, gi and gu systems are negative, except as otherwise noted.      Objective    BP (!) 143/71 (BP Location: Left arm, Patient Position: Sitting, Cuff Size: Adult Regular)   Pulse 68   Temp 98.3  F (36.8  C) (Oral)   Resp 18   Wt 67.7 kg (149 lb 3.2 oz)   SpO2 97%   BMI 30.65 kg/m    Body mass index is 30.65 kg/m .  Physical Exam   GENERAL: healthy, alert and no distress  EYES: Eyes grossly normal to inspection, PERRL and conjunctivae and sclerae normal  NECK: Very significantly enlarged thyroid with right greater than left, firm neck to palpation, no pain with palpation  RESP: lungs clear to auscultation - no rales, rhonchi or wheezes  CV: regular rate and rhythm, normal S1 S2, no S3 or S4, no murmur, click or rub, no peripheral edema and peripheral pulses strong  ABDOMEN: Mild tenderness to palpation in the right upper quadrant and just distal to the umbilicus without hepatosplenomegaly or masses, bowel sounds normal  MS: no gross musculoskeletal defects noted, no edema  SKIN: no suspicious lesions or rashes  NEURO: Normal strength and tone, mentation intact and speech normal  PSYCH: mentation appears normal, affect normal/bright  LYMPH: no cervical, supraclavicular, axillary adenopathy

## 2021-03-01 NOTE — PROGRESS NOTES
Preceptor Attestation:    I discussed the patient with the resident and evaluated the patient in person. I have verified the content of the note, which accurately reflects my assessment of the patient and the plan of care.   Supervising Physician:  Theron Mann MD.

## 2021-03-01 NOTE — NURSING NOTE
Due to patient being non-English speaking/uses sign language, an  was used for this visit. Only for face-to-face interpretation by an external agency, date and length of interpretation can be found on the scanned worksheet.       name: Evangelina Lewis  Language: Stephenong  Agency:  [employer]  Telephone number: [phone]  Type of interpretation:  telephone, spoken    OLGA Woodruff  2:13 PM  3/1/2021

## 2021-03-03 DIAGNOSIS — I10 BENIGN ESSENTIAL HYPERTENSION: Primary | ICD-10-CM

## 2021-03-03 NOTE — TELEPHONE ENCOUNTER
[facility] phone call message- patient requesting a refill:    Full Medication Name: amLODIPine (NORVASC) 5 MG tablet    Dose: Route: Take 1 tablet (5 mg) by mouth daily - Oral    Pharmacy confirmed as   [facility] [address] [place] [address] [address]  [address] [facility] [address] [facility] [address] [facility] [address]  Phone: [phone] Fax: [phone]  : Yes    Additional Comments: NONE     OK to leave a message on voice mail? Yes    Primary language: ong      needed? Yes    Call taken on March 3, 2021 at 10:20 AM by Jay Allan

## 2021-03-04 RX ORDER — AMLODIPINE BESYLATE 5 MG/1
5 TABLET ORAL DAILY
Qty: 90 TABLET | Refills: 3 | Status: SHIPPED | OUTPATIENT
Start: 2021-03-04

## 2021-03-05 ENCOUNTER — COMMUNICATION - HEALTHEAST (OUTPATIENT)
Dept: ADMINISTRATIVE | Facility: CLINIC | Age: 66
End: 2021-03-05

## 2021-03-08 ENCOUNTER — HOSPITAL ENCOUNTER (OUTPATIENT)
Dept: ULTRASOUND IMAGING | Facility: HOSPITAL | Age: 66
Discharge: HOME | End: 2021-03-08

## 2021-03-08 DIAGNOSIS — E04.2 MULTINODULAR GOITER: ICD-10-CM

## 2021-03-24 NOTE — PROGRESS NOTES
"    Assessment & Plan       Goiter  Persistent enlarged heterogeneous thyroid, with no discrete measurable thyroid nodule on the most recent US. Discussed results with patient. TSH is normal. Will resend referral to endocrinology now that imaging is completed.   - ENDOCRINOLOGY ADULT REFERRAL; Future    Benign essential hypertension  Elevated today, however patient did not take her medication this morning. Discussed the importance of taking her BP medication every day.  - Continue to monitor at future appointments    Patient Instructions   1. Endocrinology will call you to set up an appointment  2. Follow up with your primary care physician as needed      Return if symptoms worsen or fail to improve.    Sarika Butterfield MD  [facility] ANGIE Bliss Mai is a 65 year old who presents for the following health issues:    HPI     Here for follow up on thyroid imaging for her history of goiter. Having headache and having tightness sensation in the neck. Throat feels tight, and sometimes feels like it affects breathing. Reported that she did not take her BP medication this AM.     Reviewed imaging from 3/8/21 (see below).     Review of Systems   See HPI above      Objective    BP (!) 158/78   Pulse 61   Temp 97.6  F (36.4  C) (Oral)   Resp 12   Ht 1.47 m (4' 9.87\")   Wt 67.5 kg (148 lb 12.8 oz)   SpO2 100%   BMI 31.23 kg/m    Body mass index is 31.23 kg/m .  Physical Exam   GENERAL: healthy, alert and no distress  EYES: conjunctiva injected  NECK: thyromegaly  RESP: lungs clear to auscultation - no rales, rhonchi or wheezes  CV: Borderline bradycardic with regular rhythm, normal S1 S2, no S3 or S4, no murmur, click or rub  MS: no gross musculoskeletal defects noted, no edema    US Thyroid (03/08/2021 12:00 PM CST)   Interface, Rad Results In - 03/08/2021 1:29 PM CST    Formatting of this note might be different from the original.  EXAM: US THYROID  LOCATION[facility]  DATE/TIME: 3/8/2021 12:00 PM    INDICATION: Nontoxic multinodular goiter  COMPARISON: CT neck 01/13/2020 and ultrasound thyroid 07/27/2017  TECHNIQUE: Thyroid ultrasound.     FINDINGS:  RIGHT lobe: 9.3 x 4.1 x 4.6 cm. Diffusely heterogeneous. No hypervascularity and no discrete measurable nodule.  Isthmus: 22 mm.  LEFT lobe: 9.1 x 3.6 x 4.4 cm. Diffusely heterogeneous. No hypervascularity and no discrete measurable nodule.    NECK: No cervical lymphadenopathy.      IMPRESSION:   1. Persistent enlarged heterogeneous thyroid consistent with the history of thyroid goiter. No discrete measurable thyroid nodule on this exam. Previously noted right thyroid lobe nodule is not clearly seen on this exam.         ----- Service Performed and Documented by Resident or Fellow ------

## 2021-03-26 ENCOUNTER — OFFICE VISIT (OUTPATIENT)
Dept: FAMILY MEDICINE | Facility: CLINIC | Age: 66
End: 2021-03-26
Payer: COMMERCIAL

## 2021-03-26 VITALS
BODY MASS INDEX: 31.23 KG/M2 | OXYGEN SATURATION: 100 % | WEIGHT: 148.8 LBS | DIASTOLIC BLOOD PRESSURE: 78 MMHG | HEART RATE: 61 BPM | RESPIRATION RATE: 12 BRPM | SYSTOLIC BLOOD PRESSURE: 158 MMHG | TEMPERATURE: 97.6 F | HEIGHT: 58 IN

## 2021-03-26 DIAGNOSIS — E04.9 GOITER: Primary | ICD-10-CM

## 2021-03-26 DIAGNOSIS — E04.2 NONTOXIC MULTINODULAR GOITER: ICD-10-CM

## 2021-03-26 DIAGNOSIS — I10 BENIGN ESSENTIAL HYPERTENSION: ICD-10-CM

## 2021-03-26 PROCEDURE — 99213 OFFICE O/P EST LOW 20 MIN: CPT | Mod: GC | Performed by: STUDENT IN AN ORGANIZED HEALTH CARE EDUCATION/TRAINING PROGRAM

## 2021-03-26 ASSESSMENT — MIFFLIN-ST. JEOR: SCORE: 1112.7

## 2021-03-26 NOTE — NURSING NOTE
Due to patient being non-English speaking/uses sign language, an  was used for this visit. Only for face-to-face interpretation by an external agency, date and length of interpretation can be found on the scanned worksheet.     name: Fransisco Whitmore   Agency: [employer]  Language: Han   Telephone number: [phone]  Type of interpretation: Telephone, spoken

## 2021-03-26 NOTE — PATIENT INSTRUCTIONS
1. Endocrinology will call you to set up an appointment  2. Follow up with your primary care physician as needed    03/29/21   ENDOCRINE REFERRAL    [facility]  Phone: [phone]  Fax: [phone]    I spoke with the Endocrine clinic who will make another outreach call to get patient scheduled. They have made many outreach calls since original referral on 3/1/2021. After this she would be at the [place] outreach attempts.    Viv Rutherford

## 2021-03-26 NOTE — PROGRESS NOTES
Preceptor Attestation:    I discussed the patient with the resident and evaluated the patient in person. I have verified the content of the note, which accurately reflects my assessment of the patient and the plan of care.   Supervising Physician:  Aurelio Ch MD.

## 2021-04-03 ENCOUNTER — IMMUNIZATION (OUTPATIENT)
Dept: FAMILY MEDICINE | Facility: CLINIC | Age: 66
End: 2021-04-03
Payer: COMMERCIAL

## 2021-04-03 PROCEDURE — 91303 PR COVID VAC JANSSEN AD26 0.5ML: CPT

## 2021-04-03 PROCEDURE — 0031A PR COVID VAC JANSSEN AD26 0.5ML: CPT

## 2021-04-04 ENCOUNTER — DOCUMENTATION ONLY (OUTPATIENT)
Dept: ADMINISTRATIVE | Facility: CLINIC | Age: 66
End: 2021-04-04

## 2021-04-05 DIAGNOSIS — Z20.822 ENCOUNTER FOR LABORATORY TESTING FOR COVID-19 VIRUS: Primary | ICD-10-CM

## 2021-04-05 DIAGNOSIS — Z20.822 ENCOUNTER FOR LABORATORY TESTING FOR COVID-19 VIRUS: ICD-10-CM

## 2021-04-05 LAB
SARS-COV-2 RNA RESP QL NAA+PROBE: NORMAL
SPECIMEN SOURCE: NORMAL

## 2021-04-05 PROCEDURE — 99207 PR NO CHARGE LOS: CPT

## 2021-04-05 NOTE — PROGRESS NOTES
Specimen obtained.    Yang Busch on 4/5/2021 at 10:33 AM    COVID-19 PCR test completed. Patient handout For Patients Who Have Been Tested for Covid-19 (Coronavirus) was given to the patient, which includes test result notification process.

## 2021-04-06 LAB
LABORATORY COMMENT REPORT: NORMAL
SARS-COV-2 RNA RESP QL NAA+PROBE: NEGATIVE
SPECIMEN SOURCE: NORMAL

## 2021-05-04 ENCOUNTER — OFFICE VISIT (OUTPATIENT)
Dept: ENDOCRINOLOGY | Facility: CLINIC | Age: 66
End: 2021-05-04
Payer: COMMERCIAL

## 2021-05-04 VITALS
DIASTOLIC BLOOD PRESSURE: 74 MMHG | RESPIRATION RATE: 12 BRPM | WEIGHT: 148 LBS | BODY MASS INDEX: 31.07 KG/M2 | SYSTOLIC BLOOD PRESSURE: 144 MMHG | HEIGHT: 58 IN | HEART RATE: 74 BPM

## 2021-05-04 DIAGNOSIS — E04.2 NONTOXIC MULTINODULAR GOITER: ICD-10-CM

## 2021-05-04 DIAGNOSIS — E04.9 GOITER: ICD-10-CM

## 2021-05-04 PROCEDURE — 99204 OFFICE O/P NEW MOD 45 MIN: CPT | Performed by: INTERNAL MEDICINE

## 2021-05-04 ASSESSMENT — MIFFLIN-ST. JEOR: SCORE: 1109.01

## 2021-05-04 NOTE — LETTER
"    5/4/2021         RE: Kitty Ibarra  [address] [place] [address]        Dear Colleague,    Thank you for referring your patient, Kitty Ibarra, to the [facility]. Please see a copy of my visit note below.    CC: Thyroid nodules.     HPI:  (Seen with interpretor)  Patient presents for evaluation of thyroid nodules.   Initially found in 2017 after she c/o tension and tightness in her neck.     US 7/27/17:      US 3/8/21:  FINDINGS:  RIGHT lobe: 9.3 x 4.1 x 4.6 cm. Diffusely heterogeneous. No hypervascularity and no discrete measurable nodule.  Isthmus: 22 mm.  LEFT lobe: 9.1 x 3.6 x 4.4 cm. Diffusely heterogeneous. No hypervascularity and no discrete measurable nodule.    NECK: No cervical lymphadenopathy.    No prior work with radiation.     ROS: 10 point ROS neg other than the symptoms noted above in the HPI.    PMH:   Patient Active Problem List   Diagnosis     Hepatitis C virus carrier state (H)     Chronic low back pain     Hyperlipidemia LDL goal <100     Screening mammogram     Benign essential hypertension     Multinodular goiter ;  TI-RADS 2 classification     Pterygium eye, left      Meds:  Current Outpatient Medications   Medication     amLODIPine (NORVASC) 5 MG tablet     atorvastatin (LIPITOR) 20 MG tablet     famotidine (PEPCID) 20 MG tablet     glycerin-hypromellose- (ARTIFICIAL TEARS) 0.2-0.2-1 % SOLN ophthalmic solution     order for DME     No current facility-administered medications for this visit.       FHX:   No thyroid disease.     SHX:  Non-smoker.     Exam:   Vital signs:      BP: (!) 144/74 Pulse: 74   Resp: 12       Height: 147 cm (4' 9.87\") Weight: 67.1 kg (148 lb)  Estimated body mass index is 31.07 kg/m  as calculated from the following:    Height as of this encounter: 1.47 m (4' 9.87\").    Weight as of this encounter: 67.1 kg (148 lb).  Gen: In NAD.   HEENT: no proptosis or lid lag, EOMI, diffusely enlarged thyroid.   Card: S1 S2 RRR no m/r/g. no LE edema. "   Pulm: CTA b/l.   GI: NT ND +BS.   MSK: no gross deformities.   Derm: no rashes or lesions.   Neuro: no tremor, +2 DTR's.     A/P:   Thyroid nodules - TSH normal. I have reviewed the natural history of thyroid nodules and thyroid cancer with the patient. On most recent US in 3/2021, no clear nodules seen. She is having discomfort and we discussed possible surgery. It is possible an occult malignancy will be seen on the final pathology but at this time there is no clear target for FNA.   -Referral to Surgery, Dr Mcdowell.       Itz Watt M.D          Again, thank you for allowing me to participate in the care of your patient.        Sincerely,        Itz Watt MD

## 2021-05-04 NOTE — NURSING NOTE
"Chief Complaint   Patient presents with     Consult     nontoxiv mutilnodular goiter       Initial BP (!) 144/74 (BP Location: Right arm, Patient Position: Sitting, Cuff Size: Adult Large)   Pulse 74   Resp 12   Ht 1.47 m (4' 9.87\")   Wt 67.1 kg (148 lb)   BMI 31.07 kg/m   Estimated body mass index is 31.07 kg/m  as calculated from the following:    Height as of this encounter: 1.47 m (4' 9.87\").    Weight as of this encounter: 67.1 kg (148 lb).  BP completed using cuff size: large  Medications and allergies reviewed.      Viv VAN MA    "

## 2021-05-04 NOTE — PROGRESS NOTES
"CC: Thyroid nodules.     HPI:  (Seen with interpretor)  Patient presents for evaluation of thyroid nodules.   Initially found in 2017 after she c/o tension and tightness in her neck.     US 7/27/17:      US 3/8/21:  FINDINGS:  RIGHT lobe: 9.3 x 4.1 x 4.6 cm. Diffusely heterogeneous. No hypervascularity and no discrete measurable nodule.  Isthmus: 22 mm.  LEFT lobe: 9.1 x 3.6 x 4.4 cm. Diffusely heterogeneous. No hypervascularity and no discrete measurable nodule.    NECK: No cervical lymphadenopathy.    No prior work with radiation.     ROS: 10 point ROS neg other than the symptoms noted above in the HPI.    PMH:   Patient Active Problem List   Diagnosis     Hepatitis C virus carrier state (H)     Chronic low back pain     Hyperlipidemia LDL goal <100     Screening mammogram     Benign essential hypertension     Multinodular goiter ;  TI-RADS 2 classification     Pterygium eye, left      Meds:  Current Outpatient Medications   Medication     amLODIPine (NORVASC) 5 MG tablet     atorvastatin (LIPITOR) 20 MG tablet     famotidine (PEPCID) 20 MG tablet     glycerin-hypromellose- (ARTIFICIAL TEARS) 0.2-0.2-1 % SOLN ophthalmic solution     order for DME     No current facility-administered medications for this visit.       FHX:   No thyroid disease.     SHX:  Non-smoker.     Exam:   Vital signs:      BP: (!) 144/74 Pulse: 74   Resp: 12       Height: 147 cm (4' 9.87\") Weight: 67.1 kg (148 lb)  Estimated body mass index is 31.07 kg/m  as calculated from the following:    Height as of this encounter: 1.47 m (4' 9.87\").    Weight as of this encounter: 67.1 kg (148 lb).  Gen: In NAD.   HEENT: no proptosis or lid lag, EOMI, diffusely enlarged thyroid.   Card: S1 S2 RRR no m/r/g. no LE edema.   Pulm: CTA b/l.   GI: NT ND +BS.   MSK: no gross deformities.   Derm: no rashes or lesions.   Neuro: no tremor, +2 DTR's.     A/P:   Thyroid nodules - TSH normal. I have reviewed the natural history of thyroid nodules and thyroid " cancer with the patient. On most recent US in 3/2021, no clear nodules seen. She is having discomfort and we discussed possible surgery. It is possible an occult malignancy will be seen on the final pathology but at this time there is no clear target for FNA.   -Referral to Surgery, Dr Mcdowell.       Itz Watt M.D

## 2021-05-13 ENCOUNTER — OFFICE VISIT (OUTPATIENT)
Dept: SURGERY | Facility: CLINIC | Age: 66
End: 2021-05-13
Payer: COMMERCIAL

## 2021-05-13 VITALS
SYSTOLIC BLOOD PRESSURE: 136 MMHG | BODY MASS INDEX: 31.49 KG/M2 | HEART RATE: 73 BPM | WEIGHT: 150 LBS | DIASTOLIC BLOOD PRESSURE: 74 MMHG

## 2021-05-13 DIAGNOSIS — E04.2 NON-TOXIC MULTINODULAR GOITER: Primary | ICD-10-CM

## 2021-05-13 PROCEDURE — 99204 OFFICE O/P NEW MOD 45 MIN: CPT | Mod: 25 | Performed by: SURGERY

## 2021-05-13 PROCEDURE — 31575 DIAGNOSTIC LARYNGOSCOPY: CPT | Performed by: SURGERY

## 2021-05-13 NOTE — LETTER
5/13/2021         RE: Kitty Ibarra  [address] [place] [address]        Dear Colleague,    Thank you for referring your patient, Kitty Ibarra, to the [facility]. Please see a copy of my visit note below.    I was asked to see Kitty Ibarra regarding multinodular goiter by Andrez Fish     Kitty Ibarra is a 65 year old female with a multinodular goiter. This was found many years ago. The patient reports that there has been growth recently. There no associated symptoms of  dysphonia, or dyspnea. Positive for dysphagia.The patient denies a family history of thyroid cancer or a history of exposure to ionizing radiation. The TSH was 0.52 on 3/1/21.  A CT scan was done that shows a multinodular goiter. US in 2021 from [facility] showed, persistent enlarged heterogeneous thyroid consistent with the history of thyroid goiter. No discrete measurable thyroid nodule on this exam. Previously noted right thyroid lobe nodule is not clearly seen on this exam.   She is here to discuss surgery.    Past Medical History:   has a past medical history of Anxiety, Hypertension, and Multinodular goiter ;  TI-RADS 2 classification (7/31/2017).    Past Surgical History:  No past surgical history on file.     Social History:  Social History     Socioeconomic History     Marital status: Single     Spouse name: Not on file     Number of children: Not on file     Years of education: Not on file     Highest education level: Not on file   Occupational History     Not on file   Social Needs     Financial resource strain: Not on file     Food insecurity     Worry: Not on file     Inability: Not on file     Transportation needs     Medical: Not on file     Non-medical: Not on file   Tobacco Use     Smoking status: Never Smoker     Smokeless tobacco: Never Used   Substance and Sexual Activity     Alcohol use: No     Drug use: No     Sexual activity: Not on file   Lifestyle     Physical activity     Days per week: Not on file      Minutes per session: Not on file     Stress: Not on file   Relationships     Social connections     Talks on phone: Not on file     Gets together: Not on file     Attends [employer] service: Not on file     Active member of club or organization: Not on file     Attends meetings of clubs or organizations: Not on file     Relationship status: Not on file     Intimate partner violence     Fear of current or ex partner: Not on file     Emotionally abused: Not on file     Physically abused: Not on file     Forced sexual activity: Not on file   Other Topics Concern     Not on file   Social History Narrative     Not on file        Family History:  Family History   Problem Relation Age of Onset     Diabetes No family hx of      Coronary Artery Disease No family hx of      Breast Cancer No family hx of      Colon Cancer No family hx of      Prostate Cancer No family hx of      Other Cancer No family hx of        ROS:  General: negative for fever or chills  Skin: negative except mass noted above  Ears/Nose/Throat: negative for, epistaxis, bleeding gums  Respiratory: No shortness of breath, dyspnea on exertion, cough, or hemoptysis  Cardiovascular: negative for and chest pain  Gastrointestinal: negative for, nausea, vomiting and abdominal pain  Genitourinary: negative for and frequency  Neurologic: negative for and local weakness  Hematologic/Lymphatic/Immunologic: negative for, bleeding disorder and frequent infections  Endocrine: negative for, diabetes, polydipsia and polyuria    PHYSICAL EXAMINATION: A comprehensive head and neck examination was performed. Of note, there is a palpable enlargement of the thyroid gland. There were no other palpable masses or adenopathy.    Vitals: /74   Pulse 73   Wt 68 kg (150 lb)   BMI 31.49 kg/m    BMI= Body mass index is 31.49 kg/m .  Constitutional: healthy, alert and no distress  Head: Normocephalic. No masses, lesions, tenderness or abnormalities  Neck: Neck supple. No  adenopathy.   ENT: Mucous membranes moist  Respiratory:  Non-labored respiration  Musculoskeletal: No gross deformity  Neurologic: No focal deficits  Psychiatric: mentation appears normal and affect normal/bright  Hematologic/Lymphatic/Immunologic: No bruising noted    PROCEDURE: Preprocedure diagnosis: Goiter; Postprocedure diagnosis: Sameoral consent was obtained from the patient to Genasal nasal spray was applied topically to the nasal cavity. A fiberoptic laryngoscope was then passed through the patient's nasal cavity into their nasopharynx. This laryngoscope was then used to visualize the contents and structures of the patient's pharynx and larynx. The anatomy of the pharynx and larynx was normal. The mucosa was normal throughout. There were no masses or lesions seen. The true vocal cords were mobile bilaterally.    IMPRESSION: Total thyroidectomy     RECOMMENDATION:  Given the enlarged thyroid, thyroidectomy is reasonable. Fortunately, she is an excellent candidate for a total thyroidectomy with laryngeal nerve monitoring on an outpatient basis. The risks, benefits and alternatives of the surgery were discussed, including (but not limited to) nerve injury (temporary or permanent), voice changes or breathing difficulty, hypoparathyroidism (temporary or permanent), pain, bleeding, infection, scarring, persistent disease and the potential need for thyroid supplementation or additional surgeries. The patient is eager to proceed with surgery, and we have taken the liberty of arranging this.       Again, thank you for allowing me to participate in the care of your patient.        Sincerely,        Beni Mcdowell DO

## 2021-05-13 NOTE — PROGRESS NOTES
I was asked to see Kitty Ibarra regarding multinodular goiter by Andrez Fish     Kitty Ibarra is a 65 year old female with a multinodular goiter. This was found many years ago. The patient reports that there has been growth recently. There no associated symptoms of  dysphonia, or dyspnea. Positive for dysphagia.The patient denies a family history of thyroid cancer or a history of exposure to ionizing radiation. The TSH was 0.52 on 3/1/21.  A CT scan was done that shows a multinodular goiter. US in 2021 from [facility] showed, persistent enlarged heterogeneous thyroid consistent with the history of thyroid goiter. No discrete measurable thyroid nodule on this exam. Previously noted right thyroid lobe nodule is not clearly seen on this exam.   She is here to discuss surgery.    Past Medical History:   has a past medical history of Anxiety, Hypertension, and Multinodular goiter ;  TI-RADS 2 classification (7/31/2017).    Past Surgical History:  No past surgical history on file.     Social History:  Social History     Socioeconomic History     Marital status: Single     Spouse name: Not on file     Number of children: Not on file     Years of education: Not on file     Highest education level: Not on file   Occupational History     Not on file   Social Needs     Financial resource strain: Not on file     Food insecurity     Worry: Not on file     Inability: Not on file     Transportation needs     Medical: Not on file     Non-medical: Not on file   Tobacco Use     Smoking status: Never Smoker     Smokeless tobacco: Never Used   Substance and Sexual Activity     Alcohol use: No     Drug use: No     Sexual activity: Not on file   Lifestyle     Physical activity     Days per week: Not on file     Minutes per session: Not on file     Stress: Not on file   Relationships     Social connections     Talks on phone: Not on file     Gets together: Not on file     Attends Episcopal service: Not on file     Active member of club or  organization: Not on file     Attends meetings of clubs or organizations: Not on file     Relationship status: Not on file     Intimate partner violence     Fear of current or ex partner: Not on file     Emotionally abused: Not on file     Physically abused: Not on file     Forced sexual activity: Not on file   Other Topics Concern     Not on file   Social History Narrative     Not on file        Family History:  Family History   Problem Relation Age of Onset     Diabetes No family hx of      Coronary Artery Disease No family hx of      Breast Cancer No family hx of      Colon Cancer No family hx of      Prostate Cancer No family hx of      Other Cancer No family hx of        ROS:  General: negative for fever or chills  Skin: negative except mass noted above  Ears/Nose/Throat: negative for, epistaxis, bleeding gums  Respiratory: No shortness of breath, dyspnea on exertion, cough, or hemoptysis  Cardiovascular: negative for and chest pain  Gastrointestinal: negative for, nausea, vomiting and abdominal pain  Genitourinary: negative for and frequency  Neurologic: negative for and local weakness  Hematologic/Lymphatic/Immunologic: negative for, bleeding disorder and frequent infections  Endocrine: negative for, diabetes, polydipsia and polyuria    PHYSICAL EXAMINATION: A comprehensive head and neck examination was performed. Of note, there is a palpable enlargement of the thyroid gland. There were no other palpable masses or adenopathy.    Vitals: /74   Pulse 73   Wt 68 kg (150 lb)   BMI 31.49 kg/m    BMI= Body mass index is 31.49 kg/m .  Constitutional: healthy, alert and no distress  Head: Normocephalic. No masses, lesions, tenderness or abnormalities  Neck: Neck supple. No adenopathy.   ENT: Mucous membranes moist  Respiratory:  Non-labored respiration  Musculoskeletal: No gross deformity  Neurologic: No focal deficits  Psychiatric: mentation appears normal and affect  normal/bright  Hematologic/Lymphatic/Immunologic: No bruising noted    PROCEDURE: Preprocedure diagnosis: Goiter; Postprocedure diagnosis: Sameoral consent was obtained from the patient to Genasal nasal spray was applied topically to the nasal cavity. A fiberoptic laryngoscope was then passed through the patient's nasal cavity into their nasopharynx. This laryngoscope was then used to visualize the contents and structures of the patient's pharynx and larynx. The anatomy of the pharynx and larynx was normal. The mucosa was normal throughout. There were no masses or lesions seen. The true vocal cords were mobile bilaterally.    IMPRESSION: Total thyroidectomy     RECOMMENDATION:  Given the enlarged thyroid, thyroidectomy is reasonable. Fortunately, she is an excellent candidate for a total thyroidectomy with laryngeal nerve monitoring on an outpatient basis. The risks, benefits and alternatives of the surgery were discussed, including (but not limited to) nerve injury (temporary or permanent), voice changes or breathing difficulty, hypoparathyroidism (temporary or permanent), pain, bleeding, infection, scarring, persistent disease and the potential need for thyroid supplementation or additional surgeries. The patient is eager to proceed with surgery, and we have taken the liberty of arranging this.

## 2021-05-13 NOTE — PROGRESS NOTES
Type of surgery: Total thyroidectomy   Location of surgery: [facility]  Date and time of surgery: 06/02/2021  Surgeon: Jeremias   Pre-Op Appt Date: Pt will scheduled closer to home Dr. Abe Reyes   Post-Op Appt Date: 06/17/2021   Packet sent out: Yes /given at window   Pre-cert/Authorization completed:  No  Date:

## 2021-05-14 ENCOUNTER — TELEPHONE (OUTPATIENT)
Dept: SURGERY | Facility: CLINIC | Age: 66
End: 2021-05-14

## 2021-05-14 NOTE — TELEPHONE ENCOUNTER
Type of surgery: Total Thyroidectomy  CPT  19546   Non-toxic multinodular goiter E04.2    Location of surgery: [facility]  Date and time of surgery: 06/02/2021  Surgeon: Jeremias  Pre-Op Appt Date: pt will schedule with a  Closer to home  Post-Op Appt Date: 06/17/2021   Packet sent out: Yes / given at window   Pre-cert/Authorization completed:  No prior auth required per [employer] online list.  Date: 5/14/21    Elle Turk  Prior Authorization Dept  [phone]

## 2021-05-24 ENCOUNTER — OFFICE VISIT (OUTPATIENT)
Dept: FAMILY MEDICINE | Facility: CLINIC | Age: 66
End: 2021-05-24
Payer: COMMERCIAL

## 2021-05-24 VITALS
BODY MASS INDEX: 31.66 KG/M2 | OXYGEN SATURATION: 98 % | TEMPERATURE: 98.3 F | HEART RATE: 58 BPM | WEIGHT: 150.8 LBS | SYSTOLIC BLOOD PRESSURE: 166 MMHG | RESPIRATION RATE: 16 BRPM | DIASTOLIC BLOOD PRESSURE: 80 MMHG

## 2021-05-24 DIAGNOSIS — Z01.818 PREOP GENERAL PHYSICAL EXAM: Primary | ICD-10-CM

## 2021-05-24 PROCEDURE — 99214 OFFICE O/P EST MOD 30 MIN: CPT | Mod: GC | Performed by: STUDENT IN AN ORGANIZED HEALTH CARE EDUCATION/TRAINING PROGRAM

## 2021-05-24 PROCEDURE — 93000 ELECTROCARDIOGRAM COMPLETE: CPT | Mod: GC | Performed by: STUDENT IN AN ORGANIZED HEALTH CARE EDUCATION/TRAINING PROGRAM

## 2021-05-24 NOTE — PROGRESS NOTES
Preceptor Attestation:    I discussed the patient with the resident and evaluated the patient in person. I have verified the content of the note, which accurately reflects my assessment of the patient and the plan of care.   Supervising Physician:  Theron Mann MD.  Preceptor Attestation:   I personally viewed the EKG and agree with the interpretation documented by the resident. I have verified the content of the note, which accurately reflects my assessment of the patient and the plan of care.   Supervising Physician:  Theron Mann MD.                                         never

## 2021-05-24 NOTE — PATIENT INSTRUCTIONS

## 2021-05-24 NOTE — NURSING NOTE
Due to patient being non-English speaking/uses sign language, an  was used for this visit. Only for face-to-face interpretation by an external agency, date and length of interpretation can be found on the scanned worksheet.       name: Odalis Stacey  Language: Han  Agency:  [employer]  Telephone number: [phone]  Type of interpretation:  Face-to-face, spoken

## 2021-05-24 NOTE — PROGRESS NOTES
[facility] [address] [place] [address]  Phone: [phone]  Fax: [phone]  Primary Provider: Andrez Rodriguez  Pre-op Performing Provider: ANRDEZ RODRIGUEZ    PREOPERATIVE EVALUATION:  Today's date: 5/24/2021    Kitty Ibarra is a 65 year old female who presents for a preoperative evaluation.    Surgical Information:  Surgery/Procedure: Total Thyroidectomy   Surgery Location: [facility]   Surgeon: Dr. Mcdowell  Surgery Date: 06/02/21  Time of Surgery: 3pm  Where patient plans to recover: At home with family  Fax number for surgical facility: [phone]    Type of Anesthesia Anticipated: General    Assessment & Plan     The proposed surgical procedure is considered LOW risk.    Preop general physical exam  Pt prepared for surgery. She has no history of cardiac disease, diabetes, CKD. She does have HTN which is typically well controlled on amlodipine 5mg daily. She reports she takes this irregularly and is less than ideally controlled today.  - EKG 12-lead complete w/read - Clinics         Risks and Recommendations:  The patient has the following additional risks and recommendations for perioperative complications:   - No identified additional risk factors other than previously addressed    Medication Instructions:  Patient is to take all scheduled medications on the day of surgery    RECOMMENDATION:  APPROVAL GIVEN to proceed with proposed procedure, without further diagnostic evaluation.      Subjective     HPI related to upcoming procedure:   History of multinodular goiter with recent growth, pt seen by neck surgery and determined to be an excellent candidate for total thyroidectomy.     Preop Questions 5/24/2021   1. Have you ever had a heart attack or stroke? No   2. Have you ever had surgery on your heart or blood vessels, such as a stent placement, a coronary artery bypass, or surgery on an artery in your head, neck, heart, or legs? No   3. Do you have chest pain with  activity? No   4. Do you have a history of  heart failure? No   5. Do you currently have a cold, bronchitis or symptoms of other infection? No   6. Do you have a cough, shortness of breath, or wheezing? No   7. Do you or anyone in your family have previous history of blood clots? No   8. Do you or does anyone in your family have a serious bleeding problem such as prolonged bleeding following surgeries or cuts? No   9. Have you ever had problems with anemia or been told to take iron pills? No   10. Have you had any abnormal blood loss such as black, tarry or bloody stools, or abnormal vaginal bleeding? No   11. Have you ever had a blood transfusion? No   12. Are you willing to have a blood transfusion if it is medically needed before, during, or after your surgery? Yes   13. Have you or any of your relatives ever had problems with anesthesia? No   14. Do you have sleep apnea, excessive snoring or daytime drowsiness? No   15. Do you have any artifical heart valves or other implanted medical devices like a pacemaker, defibrillator, or continuous glucose monitor? No   16. Do you have artificial joints? No   17. Are you allergic to latex? No       Health Care Directive:  Patient does not have a Health Care Directive or Living Will: Discussed advance care planning with patient; information given to patient to review.    Preoperative Review of :   reviewed - no record of controlled substances prescribed.      Status of Chronic Conditions:  HYPERTENSION - Patient has longstanding history of HTN , currently denies any symptoms referable to elevated blood pressure. Specifically denies chest pain, palpitations, dyspnea, orthopnea, PND or peripheral edema. Blood pressure readings have not been in normal range. Current medication regimen is as listed below. Patient denies any side effects of medication.       Review of Systems  CONSTITUTIONAL: NEGATIVE for fever, chills, change in weight  INTEGUMENTARY/SKIN: NEGATIVE for  worrisome rashes, moles or lesions  EYES: NEGATIVE for vision changes or irritation  ENT/MOUTH: NEGATIVE for ear, mouth and throat problems  RESP: NEGATIVE for significant cough or SOB  BREAST: NEGATIVE for masses, tenderness or discharge  CV: NEGATIVE for chest pain, palpitations or peripheral edema  GI: NEGATIVE for nausea, abdominal pain, or change in bowel habits, + Heartburn  : NEGATIVE for frequency, dysuria, or hematuria  MUSCULOSKELETAL: NEGATIVE for significant arthralgias or myalgia  NEURO: NEGATIVE for weakness, dizziness or paresthesias  ENDOCRINE: NEGATIVE for temperature intolerance, skin/hair changes  HEME: NEGATIVE for bleeding problems  PSYCHIATRIC: NEGATIVE for changes in mood or affect    Patient Active Problem List    Diagnosis Date Noted     Multinodular goiter ;  TI-RADS 2 classification 07/31/2017     Priority: Medium     Needs U/S repeated 07/2018       Pterygium eye, left 07/31/2017     Priority: Medium     Reports first appeared in 2010.        Benign essential hypertension 07/26/2017     Priority: Medium     Hepatitis C virus carrier state (H) 10/05/2016     Priority: Medium     03/2015: Hep C Qnt 7,340,000 ; Hep C antibody reactive. Was [place] [facility] [place] by Dr. Dewayne Wesley  Hep B immune       Chronic low back pain 10/05/2016     Priority: Medium     Per records: not interested in pain clinic. On gabapentin. Pain radiating to left leg.   2011 MRI in past showing moderate spinal stenosis and multiple foraminal narrowing.        Hyperlipidemia LDL goal <100 10/05/2016     Priority: Medium     02/2015         Screening mammogram 04/29/2015     Priority: Medium     04/2015: No mass or malignancy. Normal findings.         Past Medical History:   Diagnosis Date     Anxiety      Hypertension      Multinodular goiter ;  TI-RADS 2 classification 7/31/2017     History reviewed. No pertinent surgical history.  Current Outpatient Medications   Medication Sig Dispense  Refill     amLODIPine (NORVASC) 5 MG tablet Take 1 tablet (5 mg) by mouth daily 90 tablet 3     famotidine (PEPCID) 20 MG tablet Take 1 tablet (20 mg) by mouth 2 times daily 60 tablet 0     glycerin-hypromellose- (ARTIFICIAL TEARS) 0.2-0.2-1 % SOLN ophthalmic solution Place 1 drop Into the left eye 3 times daily as needed for dry eyes 1 Bottle 11     order for DME Blood pressure cuff. To be used daily 1 Units 0     atorvastatin (LIPITOR) 20 MG tablet Take 1 tablet (20 mg) by mouth daily (Patient not taking: Reported on 5/13/2021) 90 tablet 3       Allergies   Allergen Reactions     Nka [No Known Allergies]         Social History     Tobacco Use     Smoking status: Never Smoker     Smokeless tobacco: Never Used   Substance Use Topics     Alcohol use: No     Family History   Problem Relation Age of Onset     Diabetes No family hx of      Coronary Artery Disease No family hx of      Breast Cancer No family hx of      Colon Cancer No family hx of      Prostate Cancer No family hx of      Other Cancer No family hx of      History   Drug Use No         Objective     BP (!) 166/78 (BP Location: Left arm, Patient Position: Sitting, Cuff Size: Adult Regular)   Pulse 58   Temp 98.3  F (36.8  C) (Oral)   Resp 16   Wt 68.4 kg (150 lb 12.8 oz)   SpO2 98%   BMI 31.66 kg/m      Physical Exam    GENERAL APPEARANCE: healthy, alert and no distress     EYES: EOMI, PERRL     HENT: ear canals and TM's normal and nose and mouth without ulcers or lesions     NECK: no adenopathy, no asymmetry, masses, or scars and thyroid normal to palpation     RESP: lungs clear to auscultation - no rales, rhonchi or wheezes     CV: regular rates and rhythm, normal S1 S2, no S3 or S4 and no murmur, click or rub     ABDOMEN:  soft, nontender, no HSM or masses and bowel sounds normal     MS: extremities normal- no gross deformities noted, no evidence of inflammation in joints, FROM in all extremities.     SKIN: no suspicious lesions or rashes      NEURO: Normal strength and tone, sensory exam grossly normal, mentation intact and speech normal     PSYCH: mentation appears normal. and affect normal/bright     LYMPHATICS: No cervical adenopathy    Recent Labs   Lab Test 03/01/21  1557   HGB 13.8      POTASSIUM 4.1   CR 0.68        Diagnostics:  No labs were ordered during this visit.   EKG: appears normal, NSR, PROLONGED QTC - not on any QT prolonging meds    Revised Cardiac Risk Index (RCRI):  The patient has the following serious cardiovascular risks for perioperative 2complications:   - No serious cardiac risks = 0 points     RCRI Interpretation: 1 point: Class II (low risk - 0.9% complication rate)           Signed Electronically by: Andrez Fish MD  Copy of this evaluation report is provided to requesting physician.

## 2021-05-28 ENCOUNTER — TELEPHONE (OUTPATIENT)
Dept: FAMILY MEDICINE | Facility: CLINIC | Age: 66
End: 2021-05-28

## 2021-05-28 DIAGNOSIS — Z20.822 ENCOUNTER FOR LABORATORY TESTING FOR COVID-19 VIRUS: ICD-10-CM

## 2021-05-28 DIAGNOSIS — Z20.822 ENCOUNTER FOR LABORATORY TESTING FOR COVID-19 VIRUS: Primary | ICD-10-CM

## 2021-05-28 LAB
SARS-COV-2 RNA RESP QL NAA+PROBE: NORMAL
SPECIMEN SOURCE: NORMAL

## 2021-05-28 PROCEDURE — 99207 PR NO CHARGE LOS: CPT

## 2021-05-28 NOTE — LETTER
June 17, 2021      Kitty Ibarra  [address] [place] [address]        Dear ,    We are writing to inform you of your test results.    I hope you're well. I wanted to communicate with you the results of the tests that we did.      Which is good news! The laboratory results show negative COVID test. Please let me know if you have any other questions or concerns.          Resulted Orders   COVID-19 Virus PCR [facility] ([facility])   Result Value Ref Range    COVID-19 Virus PCR to [facility] - Source Nasopharyngeal     COVID-19 Virus PCR to [facility] - Result       Test received-See reflex to IDDL test SARS CoV2 (COVID-19) Virus RT-PCR    Narrative    Test performed by:  [facility]  [address], [address], [place] [address]       If you have any questions or concerns, please call the clinic at the number listed above.       Thank you!      Sarika Butterfield MD PGY2

## 2021-05-28 NOTE — TELEPHONE ENCOUNTER
Surgery has not been cancelled. [place]ified still [place] [facility] [place]2-[place] 10:15am, arrive 8:45am. Patient passed pre-op per notes in chart. LVM advising patient that surgery has NOT been cancelled. Left [phone] if any further questions.

## 2021-05-28 NOTE — TELEPHONE ENCOUNTER
Reason for call:  Other   Patient called regarding (reason for call): call back  Additional comments: Pt received a call about surgery being canceled,  She would like someone to call her back about this and confirm some information    Phone number to reach patient:  Home number on file  [phone]      Can we leave a detailed message on this number?  YES

## 2021-06-02 ENCOUNTER — NURSE TRIAGE (OUTPATIENT)
Dept: NURSING | Facility: CLINIC | Age: 66
End: 2021-06-02

## 2021-06-02 NOTE — RESULT ENCOUNTER NOTE
Hello November,    Please call the following patient with the results below. Thank you!    Lilli Ibarra,    I hope you're well. I wanted to communicate with you the results of the tests that we did.     Which is good news! The laboratory results show negative COVID test. Please let me know if you have any other questions or concerns.     Thank you!  Sarika Butterfield MD PGY2

## 2021-06-03 ENCOUNTER — TELEPHONE (OUTPATIENT)
Dept: FAMILY MEDICINE | Facility: CLINIC | Age: 66
End: 2021-06-03

## 2021-06-03 NOTE — TELEPHONE ENCOUNTER
"Routing to Gen Surg team to review.    Adonay, pharmacist at [facility], called asking for verbal clarification for a paper percocet prescription written by Dr. Beni Mcdowell, general surgery.    Asking to add \"max of 6 tabs per day\" to the prescription in order to \"stay within the opioid guidelines\".     Please call Adonay/pharmacy back with verbal orders: [phone].    Viv PRADO RN, BSN  [facility]       "

## 2021-06-03 NOTE — TELEPHONE ENCOUNTER
Patient had same day surgery today with Beni Mcdowell MD with general surgery at the [facility] (Total thyroidectomy) Patient saw Dr Mcdowell at the [facility] pre-op.     Daughter in law calling states the Percocet Rx was brought into the pharmacy, and the pharmacy states there was a dosing error with the Rx and patient needs to contact the hospital/surgeon.     RN paged on call provider for [facility] surgery- ([facility] surgery), Isma Ramos MD via [employer] web at 8:19 pm to call Jenn cortez back directly at [phone]. RN advised patient to phone back nurse line in 20 minutes if no response from on call MD and patient agreed.    Sonia Feliciano RN/[facility]            COVID 19 Nurse Triage Plan/Patient Instructions    Please be aware that novel coronavirus (COVID-19) may be circulating in the community. If you develop symptoms such as fever, cough, or SOB or if you have concerns about the presence of another infection including coronavirus (COVID-19), please contact your health care provider or visit [email or web address].     Disposition/Instructions    Page on call surgeon    Thank you for taking steps to prevent the spread of this virus.  o Limit your contact with others.  o Wear a simple mask to cover your cough.  o Wash your hands well and often.    Resources    [facility]: About COVID-19: [email or web address]    CDC: What to Do If You're Sick: www.cdc.gov/coronavirus/2019-ncov/about/steps-when-sick.html    CDC: Ending Home Isolation: www.cdc.gov/coronavirus/2019-ncov/hcp/disposition-in-home-patients.html     CDC: Caring for Someone: www.cdc.gov/coronavirus/2019-ncov/if-you-are-sick/care-for-someone.html     [facility]: Interim Guidance for Hospital Discharge to Home: www.health.state.[place]/diseases/coronavirus/hcp/hospdischarge.pdf    [facility] clinical trials (COVID-19 research studies):  "[email or web address]-clinical-trials     Below are the COVID-19 hotlines at the [place] Department of Health ([facility]). Interpreters are available.   o For health questions: Call 719-476-7302 or 1-536.263.4271 (7 a.m. to 7 p.m.)  o For questions about schools and childcare: Call 348-429-8103 or 1-311.667.3332 (7 a.m. to 7 p.m.)                     Reason for Disposition    [1] Caller has URGENT medication question about med that PCP or specialist prescribed AND [2] triager unable to answer question    Additional Information    Negative: Drug overdose and triager unable to answer question    Negative: Caller requesting information unrelated to medicine    Negative: Caller requesting a prescription for Strep throat and has a positive culture result    Negative: Rash while taking a medication or within 3 days of stopping it    Negative: Immunization reaction suspected    Negative: [1] Asthma and [2] having symptoms of asthma (cough, wheezing, etc.)    Negative: [1] Influenza symptoms AND [2] anti-viral med prescription request, such as Tamiflu    Negative: [1] Symptom of illness (e.g., headache, abdominal pain, earache, vomiting) AND [2] more than mild    Negative: MORE THAN A DOUBLE DOSE of a prescription or over-the-counter (OTC) drug    Negative: [1] DOUBLE DOSE (an extra dose or lesser amount) of over-the-counter (OTC) drug AND [2] any symptoms (e.g., dizziness, nausea, pain, sleepiness)    Negative: [1] DOUBLE DOSE (an extra dose or lesser amount) of prescription drug AND [2] any symptoms (e.g., dizziness, nausea, pain, sleepiness)    Negative: Took another person's prescription drug    Negative: [1] DOUBLE DOSE (an extra dose or lesser amount) of prescription drug AND [2] NO symptoms (Exception: a double dose of antibiotics)    Negative: Diabetes drug error or overdose (e.g., took wrong type of insulin or took extra dose)    Negative: [1] Request for URGENT new prescription or refill of \"essential\" " medication (i.e., likelihood of harm to patient if not taken) AND [2] triager unable to fill per unit policy    Negative: [1] Prescription not at pharmacy AND [2] was prescribed by PCP recently    Negative: [1] Pharmacy calling with prescription questions AND [2] triager unable to answer question    Protocols used: MEDICATION QUESTION CALL-A-AH

## 2021-06-03 NOTE — TELEPHONE ENCOUNTER
Returned call and gave VO max 6 per day per MD Roberta LEIGH RN Specialty Triage 6/3/2021 10:33 AM

## 2021-06-15 NOTE — TELEPHONE ENCOUNTER
TSH normal, thyroid US not yet performed. Unable to advise on an appointment until the US is complete.

## 2021-06-15 NOTE — TELEPHONE ENCOUNTER
3/16 - called x 2 - pt is out of town at this time. She does not know when she will return. She will call PCP and let them know when she is ready.

## 2021-06-15 NOTE — TELEPHONE ENCOUNTER
Records received  3/3/2021 2:27pm inside ENDO Consult  [facility] - [phone]  Referring: Dr Andrez Fish  DX: non toxic multinodular goiter

## 2021-06-16 NOTE — TELEPHONE ENCOUNTER
03.29- per referring office called and request for our office to reach out to the patient.   I called but there was no answer, so I left message for the patient to call to schedule with Dr. Watt.

## 2021-06-17 ENCOUNTER — OFFICE VISIT (OUTPATIENT)
Dept: SURGERY | Facility: CLINIC | Age: 66
End: 2021-06-17
Payer: COMMERCIAL

## 2021-06-17 VITALS
DIASTOLIC BLOOD PRESSURE: 77 MMHG | SYSTOLIC BLOOD PRESSURE: 168 MMHG | HEART RATE: 74 BPM | WEIGHT: 150 LBS | BODY MASS INDEX: 31.49 KG/M2

## 2021-06-17 DIAGNOSIS — G89.18 ACUTE POST-OPERATIVE PAIN: Primary | ICD-10-CM

## 2021-06-17 DIAGNOSIS — E89.0 POSTOPERATIVE HYPOTHYROIDISM: ICD-10-CM

## 2021-06-17 PROCEDURE — 99024 POSTOP FOLLOW-UP VISIT: CPT | Performed by: SURGERY

## 2021-06-17 RX ORDER — LEVOTHYROXINE SODIUM 100 UG/1
100 TABLET ORAL DAILY
Qty: 30 TABLET | Refills: 0 | Status: SHIPPED | OUTPATIENT
Start: 2021-06-17

## 2021-06-17 RX ORDER — OXYCODONE AND ACETAMINOPHEN 5; 325 MG/1; MG/1
1 TABLET ORAL EVERY 6 HOURS PRN
Qty: 10 TABLET | Refills: 0 | Status: SHIPPED | OUTPATIENT
Start: 2021-06-17 | End: 2021-06-20

## 2021-06-17 NOTE — LETTER
6/17/2021         RE: Kitty Ibarra  [address] [place] [address]        Dear Colleague,    Thank you for referring your patient, Kitty Ibarra, to the [facility]. Please see a copy of my visit note below.    thyrGeneral Surgery Post Op    Pt returns for follow up visit s/p total thyroidectomy    Patient has been doing well, tolerating diet. Bowels moving well. Pain controlled. No issues with wound healing/redness/drainage. No fevers.  Voice not as load, no numbness or tingling    Physical exam: Vitals: BP (!) 168/77   Pulse 74   Wt 68 kg (150 lb)   BMI 31.49 kg/m    BMI= Body mass index is 31.49 kg/m .    Exam:  Constitutional: healthy, alert and no distress  Respiratory: Non-labored  Incision is healing well with no erythema or exudate    Path:  A.  Thyroid, right, hemithyroidectomy - Multinodular goiter.  No atypia or malignancy. 9 x 8 x 4.5 cm     B.  Thyroid, left, hemithyroidectomy - Multinodular goiter.  No atypia or malignancy.  11 x 6.6 x 6 cm    Assessment: Pt is doing well s/p thyroidectomy  - We discussed the pathology results and all questions were answered to the best of my ability.     Plan: Pt doing well and can follow up as needed.     Will get TSH in one month      Beni Mcdowell, DO          Again, thank you for allowing me to participate in the care of your patient.        Sincerely,        Beni Mcdowell, DO

## 2021-06-17 NOTE — PROGRESS NOTES
thyrGeneral Surgery Post Op    Pt returns for follow up visit s/p total thyroidectomy    Patient has been doing well, tolerating diet. Bowels moving well. Pain controlled. No issues with wound healing/redness/drainage. No fevers.  Voice not as load, no numbness or tingling    Physical exam: Vitals: BP (!) 168/77   Pulse 74   Wt 68 kg (150 lb)   BMI 31.49 kg/m    BMI= Body mass index is 31.49 kg/m .    Exam:  Constitutional: healthy, alert and no distress  Respiratory: Non-labored  Incision is healing well with no erythema or exudate    Path:  A.  Thyroid, right, hemithyroidectomy - Multinodular goiter.  No atypia or malignancy. 9 x 8 x 4.5 cm     B.  Thyroid, left, hemithyroidectomy - Multinodular goiter.  No atypia or malignancy.  11 x 6.6 x 6 cm    Assessment: Pt is doing well s/p thyroidectomy  - We discussed the pathology results and all questions were answered to the best of my ability.     Plan: Pt doing well and can follow up as needed.     Will get TSH in one month      Beni Mcdowell, DO

## 2023-05-02 DIAGNOSIS — I10 BENIGN ESSENTIAL HYPERTENSION: ICD-10-CM

## 2023-05-02 DIAGNOSIS — E78.5 HYPERLIPIDEMIA LDL GOAL <100: Primary | ICD-10-CM

## 2023-05-02 DIAGNOSIS — E04.2 NONTOXIC MULTINODULAR GOITER: ICD-10-CM
